# Patient Record
Sex: MALE | Race: OTHER | Employment: FULL TIME | URBAN - METROPOLITAN AREA
[De-identification: names, ages, dates, MRNs, and addresses within clinical notes are randomized per-mention and may not be internally consistent; named-entity substitution may affect disease eponyms.]

---

## 2021-08-20 ENCOUNTER — HOSPITAL ENCOUNTER (INPATIENT)
Age: 31
LOS: 2 days | Discharge: HOME | DRG: 854 | End: 2021-08-25
Attending: EMERGENCY MEDICINE | Admitting: SURGERY

## 2021-08-20 ENCOUNTER — APPOINTMENT (OUTPATIENT)
Dept: CT IMAGING | Age: 31
DRG: 854 | End: 2021-08-20
Attending: EMERGENCY MEDICINE

## 2021-08-20 ENCOUNTER — ANESTHESIA (OUTPATIENT)
Dept: SURGERY | Age: 31
DRG: 854 | End: 2021-08-20

## 2021-08-20 ENCOUNTER — APPOINTMENT (OUTPATIENT)
Dept: GENERAL RADIOLOGY | Age: 31
DRG: 854 | End: 2021-08-20
Attending: EMERGENCY MEDICINE

## 2021-08-20 ENCOUNTER — ANESTHESIA EVENT (OUTPATIENT)
Dept: SURGERY | Age: 31
DRG: 854 | End: 2021-08-20

## 2021-08-20 DIAGNOSIS — K35.30 ACUTE APPENDICITIS WITH LOCALIZED PERITONITIS, WITHOUT PERFORATION, ABSCESS, OR GANGRENE: ICD-10-CM

## 2021-08-20 DIAGNOSIS — K35.30 ACUTE APPENDICITIS WITH LOCALIZED PERITONITIS, WITHOUT PERFORATION OR ABSCESS, UNSPECIFIED WHETHER GANGRENE PRESENT: Primary | ICD-10-CM

## 2021-08-20 DIAGNOSIS — A41.9 SEPSIS WITHOUT ACUTE ORGAN DYSFUNCTION, DUE TO UNSPECIFIED ORGANISM (HCC): ICD-10-CM

## 2021-08-20 DIAGNOSIS — R19.8: ICD-10-CM

## 2021-08-20 PROBLEM — K35.80 ACUTE APPENDICITIS: Status: ACTIVE | Noted: 2021-08-20

## 2021-08-20 PROBLEM — R00.0 TACHYCARDIA: Status: ACTIVE | Noted: 2021-08-20

## 2021-08-20 PROBLEM — R50.9 FEVER: Status: ACTIVE | Noted: 2021-08-20

## 2021-08-20 PROBLEM — D72.829 LEUKOCYTOSIS: Status: ACTIVE | Noted: 2021-08-20

## 2021-08-20 PROBLEM — R10.31 RLQ ABDOMINAL PAIN: Status: ACTIVE | Noted: 2021-08-20

## 2021-08-20 LAB
ALBUMIN SERPL-MCNC: 3.3 G/DL (ref 3.5–5)
ALBUMIN/GLOB SERPL: 0.6 {RATIO} (ref 1.2–3.5)
ALP SERPL-CCNC: 82 U/L (ref 50–136)
ALT SERPL-CCNC: 36 U/L (ref 12–65)
ANION GAP SERPL CALC-SCNC: 5 MMOL/L (ref 7–16)
AST SERPL-CCNC: 16 U/L (ref 15–37)
BACTERIA URNS QL MICRO: 0 /HPF
BASOPHILS # BLD: 0 K/UL (ref 0–0.2)
BASOPHILS NFR BLD: 0 % (ref 0–2)
BILIRUB SERPL-MCNC: 1.5 MG/DL (ref 0.2–1.1)
BUN SERPL-MCNC: 10 MG/DL (ref 6–23)
CALCIUM SERPL-MCNC: 9 MG/DL (ref 8.3–10.4)
CASTS URNS QL MICRO: NORMAL /LPF
CHLORIDE SERPL-SCNC: 105 MMOL/L (ref 98–107)
CO2 SERPL-SCNC: 28 MMOL/L (ref 21–32)
CREAT SERPL-MCNC: 1.06 MG/DL (ref 0.8–1.5)
DIFFERENTIAL METHOD BLD: ABNORMAL
EOSINOPHIL # BLD: 0.1 K/UL (ref 0–0.8)
EOSINOPHIL NFR BLD: 0 % (ref 0.5–7.8)
EPI CELLS #/AREA URNS HPF: NORMAL /HPF
ERYTHROCYTE [DISTWIDTH] IN BLOOD BY AUTOMATED COUNT: 12.8 % (ref 11.9–14.6)
GLOBULIN SER CALC-MCNC: 5.5 G/DL (ref 2.3–3.5)
GLUCOSE SERPL-MCNC: 112 MG/DL (ref 65–100)
HCT VFR BLD AUTO: 46.7 % (ref 41.1–50.3)
HGB BLD-MCNC: 16 G/DL (ref 13.6–17.2)
IMM GRANULOCYTES # BLD AUTO: 0.1 K/UL (ref 0–0.5)
IMM GRANULOCYTES NFR BLD AUTO: 1 % (ref 0–5)
LACTATE SERPL-SCNC: 1.7 MMOL/L (ref 0.4–2)
LIPASE SERPL-CCNC: 93 U/L (ref 73–393)
LYMPHOCYTES # BLD: 0.4 K/UL (ref 0.5–4.6)
LYMPHOCYTES NFR BLD: 2 % (ref 13–44)
MCH RBC QN AUTO: 30 PG (ref 26.1–32.9)
MCHC RBC AUTO-ENTMCNC: 34.3 G/DL (ref 31.4–35)
MCV RBC AUTO: 87.6 FL (ref 79.6–97.8)
MONOCYTES # BLD: 0.8 K/UL (ref 0.1–1.3)
MONOCYTES NFR BLD: 4 % (ref 4–12)
NEUTS SEG # BLD: 18.4 K/UL (ref 1.7–8.2)
NEUTS SEG NFR BLD: 93 % (ref 43–78)
NRBC # BLD: 0 K/UL (ref 0–0.2)
PLATELET # BLD AUTO: 233 K/UL (ref 150–450)
PMV BLD AUTO: 10.6 FL (ref 9.4–12.3)
POTASSIUM SERPL-SCNC: 3.5 MMOL/L (ref 3.5–5.1)
PROCALCITONIN SERPL-MCNC: 13.73 NG/ML
PROT SERPL-MCNC: 8.8 G/DL (ref 6.3–8.2)
RBC # BLD AUTO: 5.33 M/UL (ref 4.23–5.6)
RBC #/AREA URNS HPF: 0 /HPF
SODIUM SERPL-SCNC: 138 MMOL/L (ref 138–145)
WBC # BLD AUTO: 19.8 K/UL (ref 4.3–11.1)
WBC URNS QL MICRO: NORMAL /HPF

## 2021-08-20 PROCEDURE — 76060000033 HC ANESTHESIA 1 TO 1.5 HR: Performed by: SURGERY

## 2021-08-20 PROCEDURE — 77030008771 HC TU NG SALEM SUMP -A: Performed by: ANESTHESIOLOGY

## 2021-08-20 PROCEDURE — 77030008606 HC TRCR ENDOSC KII AMR -B: Performed by: SURGERY

## 2021-08-20 PROCEDURE — 87150 DNA/RNA AMPLIFIED PROBE: CPT

## 2021-08-20 PROCEDURE — 77030040922 HC BLNKT HYPOTHRM STRY -A: Performed by: ANESTHESIOLOGY

## 2021-08-20 PROCEDURE — 87076 CULTURE ANAEROBE IDENT EACH: CPT

## 2021-08-20 PROCEDURE — 74011250636 HC RX REV CODE- 250/636: Performed by: NURSE ANESTHETIST, CERTIFIED REGISTERED

## 2021-08-20 PROCEDURE — 88304 TISSUE EXAM BY PATHOLOGIST: CPT

## 2021-08-20 PROCEDURE — 71045 X-RAY EXAM CHEST 1 VIEW: CPT

## 2021-08-20 PROCEDURE — 0DTJ4ZZ RESECTION OF APPENDIX, PERCUTANEOUS ENDOSCOPIC APPROACH: ICD-10-PCS | Performed by: SURGERY

## 2021-08-20 PROCEDURE — 81015 MICROSCOPIC EXAM OF URINE: CPT

## 2021-08-20 PROCEDURE — 77030008518 HC TBNG INSUF ENDO STRY -B: Performed by: SURGERY

## 2021-08-20 PROCEDURE — 77030000038 HC TIP SCIS LAPSCP SURI -B: Performed by: SURGERY

## 2021-08-20 PROCEDURE — 77030008462 HC STPLR SKN PROX J&J -A: Performed by: SURGERY

## 2021-08-20 PROCEDURE — 84145 PROCALCITONIN (PCT): CPT

## 2021-08-20 PROCEDURE — 83605 ASSAY OF LACTIC ACID: CPT

## 2021-08-20 PROCEDURE — 74011250637 HC RX REV CODE- 250/637: Performed by: ANESTHESIOLOGY

## 2021-08-20 PROCEDURE — 77030039425 HC BLD LARYNG TRULITE DISP TELE -A: Performed by: ANESTHESIOLOGY

## 2021-08-20 PROCEDURE — 74177 CT ABD & PELVIS W/CONTRAST: CPT

## 2021-08-20 PROCEDURE — 87186 SC STD MICRODIL/AGAR DIL: CPT

## 2021-08-20 PROCEDURE — 96367 TX/PROPH/DG ADDL SEQ IV INF: CPT

## 2021-08-20 PROCEDURE — 77030037088 HC TUBE ENDOTRACH ORAL NSL COVD-A: Performed by: ANESTHESIOLOGY

## 2021-08-20 PROCEDURE — 80053 COMPREHEN METABOLIC PANEL: CPT

## 2021-08-20 PROCEDURE — 77030021158 HC TRCR BLN GELPRT AMR -B: Performed by: SURGERY

## 2021-08-20 PROCEDURE — 96375 TX/PRO/DX INJ NEW DRUG ADDON: CPT

## 2021-08-20 PROCEDURE — 99285 EMERGENCY DEPT VISIT HI MDM: CPT

## 2021-08-20 PROCEDURE — 77030019908 HC STETH ESOPH SIMS -A: Performed by: ANESTHESIOLOGY

## 2021-08-20 PROCEDURE — 77030022703 HC LIGASURE  BLNT LAPSCP SEAL COVD -E: Performed by: SURGERY

## 2021-08-20 PROCEDURE — 74011000250 HC RX REV CODE- 250: Performed by: NURSE ANESTHETIST, CERTIFIED REGISTERED

## 2021-08-20 PROCEDURE — 99218 HC RM OBSERVATION: CPT

## 2021-08-20 PROCEDURE — 74011000258 HC RX REV CODE- 258: Performed by: SURGERY

## 2021-08-20 PROCEDURE — 81003 URINALYSIS AUTO W/O SCOPE: CPT

## 2021-08-20 PROCEDURE — 77030037892: Performed by: SURGERY

## 2021-08-20 PROCEDURE — 99220 PR INITIAL OBSERVATION CARE/DAY 70 MINUTES: CPT | Performed by: SURGERY

## 2021-08-20 PROCEDURE — 76010000149 HC OR TIME 1 TO 1.5 HR: Performed by: SURGERY

## 2021-08-20 PROCEDURE — 87040 BLOOD CULTURE FOR BACTERIA: CPT

## 2021-08-20 PROCEDURE — 77030027876 HC STPLR ENDOSC FLX PWR J&J -G1: Performed by: SURGERY

## 2021-08-20 PROCEDURE — 74011000250 HC RX REV CODE- 250: Performed by: SURGERY

## 2021-08-20 PROCEDURE — 44970 LAPAROSCOPY APPENDECTOMY: CPT | Performed by: SURGERY

## 2021-08-20 PROCEDURE — 74011250636 HC RX REV CODE- 250/636: Performed by: ANESTHESIOLOGY

## 2021-08-20 PROCEDURE — 85025 COMPLETE CBC W/AUTO DIFF WBC: CPT

## 2021-08-20 PROCEDURE — 93005 ELECTROCARDIOGRAM TRACING: CPT | Performed by: EMERGENCY MEDICINE

## 2021-08-20 PROCEDURE — 96365 THER/PROPH/DIAG IV INF INIT: CPT

## 2021-08-20 PROCEDURE — 76210000006 HC OR PH I REC 0.5 TO 1 HR: Performed by: SURGERY

## 2021-08-20 PROCEDURE — 77030040830 HC CATH URETH FOL MDII -A: Performed by: SURGERY

## 2021-08-20 PROCEDURE — 77030031139 HC SUT VCRL2 J&J -A: Performed by: SURGERY

## 2021-08-20 PROCEDURE — 94762 N-INVAS EAR/PLS OXIMTRY CONT: CPT

## 2021-08-20 PROCEDURE — 74011250636 HC RX REV CODE- 250/636: Performed by: EMERGENCY MEDICINE

## 2021-08-20 PROCEDURE — 74011250636 HC RX REV CODE- 250/636: Performed by: SURGERY

## 2021-08-20 PROCEDURE — 77030038552 HC DRN WND MDII -A: Performed by: SURGERY

## 2021-08-20 PROCEDURE — 74011000636 HC RX REV CODE- 636: Performed by: EMERGENCY MEDICINE

## 2021-08-20 PROCEDURE — 77030040361 HC SLV COMPR DVT MDII -B: Performed by: SURGERY

## 2021-08-20 PROCEDURE — 77030036731 HC STPLR ENDOSC J&J -F: Performed by: SURGERY

## 2021-08-20 PROCEDURE — 74011000258 HC RX REV CODE- 258: Performed by: EMERGENCY MEDICINE

## 2021-08-20 PROCEDURE — 83690 ASSAY OF LIPASE: CPT

## 2021-08-20 PROCEDURE — 2709999900 HC NON-CHARGEABLE SUPPLY: Performed by: SURGERY

## 2021-08-20 RX ORDER — ONDANSETRON 2 MG/ML
INJECTION INTRAMUSCULAR; INTRAVENOUS AS NEEDED
Status: DISCONTINUED | OUTPATIENT
Start: 2021-08-20 | End: 2021-08-20 | Stop reason: HOSPADM

## 2021-08-20 RX ORDER — SODIUM CHLORIDE 0.9 % (FLUSH) 0.9 %
10 SYRINGE (ML) INJECTION
Status: COMPLETED | OUTPATIENT
Start: 2021-08-20 | End: 2021-08-20

## 2021-08-20 RX ORDER — SODIUM CHLORIDE 0.9 % (FLUSH) 0.9 %
5-40 SYRINGE (ML) INJECTION EVERY 8 HOURS
Status: DISCONTINUED | OUTPATIENT
Start: 2021-08-20 | End: 2021-08-20 | Stop reason: HOSPADM

## 2021-08-20 RX ORDER — ONDANSETRON 2 MG/ML
4 INJECTION INTRAMUSCULAR; INTRAVENOUS
Status: COMPLETED | OUTPATIENT
Start: 2021-08-20 | End: 2021-08-20

## 2021-08-20 RX ORDER — SODIUM CHLORIDE 9 MG/ML
1000 INJECTION, SOLUTION INTRAVENOUS ONCE
Status: COMPLETED | OUTPATIENT
Start: 2021-08-20 | End: 2021-08-20

## 2021-08-20 RX ORDER — ROCURONIUM BROMIDE 10 MG/ML
INJECTION, SOLUTION INTRAVENOUS AS NEEDED
Status: DISCONTINUED | OUTPATIENT
Start: 2021-08-20 | End: 2021-08-20 | Stop reason: HOSPADM

## 2021-08-20 RX ORDER — SODIUM CHLORIDE, SODIUM LACTATE, POTASSIUM CHLORIDE, CALCIUM CHLORIDE 600; 310; 30; 20 MG/100ML; MG/100ML; MG/100ML; MG/100ML
75 INJECTION, SOLUTION INTRAVENOUS CONTINUOUS
Status: DISCONTINUED | OUTPATIENT
Start: 2021-08-20 | End: 2021-08-20 | Stop reason: HOSPADM

## 2021-08-20 RX ORDER — LIDOCAINE HYDROCHLORIDE 20 MG/ML
INJECTION, SOLUTION EPIDURAL; INFILTRATION; INTRACAUDAL; PERINEURAL AS NEEDED
Status: DISCONTINUED | OUTPATIENT
Start: 2021-08-20 | End: 2021-08-20 | Stop reason: HOSPADM

## 2021-08-20 RX ORDER — HYDROCODONE BITARTRATE AND ACETAMINOPHEN 5; 325 MG/1; MG/1
1-2 TABLET ORAL EVERY 8 HOURS
Qty: 28 TABLET | Refills: 0 | Status: SHIPPED | OUTPATIENT
Start: 2021-08-20 | End: 2021-08-25 | Stop reason: SDUPTHER

## 2021-08-20 RX ORDER — HYDROMORPHONE HYDROCHLORIDE 2 MG/ML
0.5 INJECTION, SOLUTION INTRAMUSCULAR; INTRAVENOUS; SUBCUTANEOUS
Status: DISCONTINUED | OUTPATIENT
Start: 2021-08-20 | End: 2021-08-20 | Stop reason: HOSPADM

## 2021-08-20 RX ORDER — SODIUM CHLORIDE 0.9 % (FLUSH) 0.9 %
5-10 SYRINGE (ML) INJECTION EVERY 8 HOURS
Status: DISCONTINUED | OUTPATIENT
Start: 2021-08-20 | End: 2021-08-21

## 2021-08-20 RX ORDER — DEXAMETHASONE SODIUM PHOSPHATE 4 MG/ML
INJECTION, SOLUTION INTRA-ARTICULAR; INTRALESIONAL; INTRAMUSCULAR; INTRAVENOUS; SOFT TISSUE AS NEEDED
Status: DISCONTINUED | OUTPATIENT
Start: 2021-08-20 | End: 2021-08-20 | Stop reason: HOSPADM

## 2021-08-20 RX ORDER — NALOXONE HYDROCHLORIDE 0.4 MG/ML
0.2 INJECTION, SOLUTION INTRAMUSCULAR; INTRAVENOUS; SUBCUTANEOUS AS NEEDED
Status: DISCONTINUED | OUTPATIENT
Start: 2021-08-20 | End: 2021-08-20 | Stop reason: HOSPADM

## 2021-08-20 RX ORDER — SODIUM CHLORIDE, SODIUM LACTATE, POTASSIUM CHLORIDE, CALCIUM CHLORIDE 600; 310; 30; 20 MG/100ML; MG/100ML; MG/100ML; MG/100ML
150 INJECTION, SOLUTION INTRAVENOUS CONTINUOUS
Status: DISCONTINUED | OUTPATIENT
Start: 2021-08-20 | End: 2021-08-21

## 2021-08-20 RX ORDER — SODIUM CHLORIDE 0.9 % (FLUSH) 0.9 %
5-10 SYRINGE (ML) INJECTION AS NEEDED
Status: DISCONTINUED | OUTPATIENT
Start: 2021-08-20 | End: 2021-08-21

## 2021-08-20 RX ORDER — MORPHINE SULFATE 10 MG/ML
5 INJECTION, SOLUTION INTRAMUSCULAR; INTRAVENOUS
Status: COMPLETED | OUTPATIENT
Start: 2021-08-20 | End: 2021-08-20

## 2021-08-20 RX ORDER — PROPOFOL 10 MG/ML
INJECTION, EMULSION INTRAVENOUS AS NEEDED
Status: DISCONTINUED | OUTPATIENT
Start: 2021-08-20 | End: 2021-08-20 | Stop reason: HOSPADM

## 2021-08-20 RX ORDER — BUPIVACAINE HYDROCHLORIDE 2.5 MG/ML
INJECTION, SOLUTION EPIDURAL; INFILTRATION; INTRACAUDAL AS NEEDED
Status: DISCONTINUED | OUTPATIENT
Start: 2021-08-20 | End: 2021-08-20 | Stop reason: HOSPADM

## 2021-08-20 RX ORDER — METRONIDAZOLE 500 MG/100ML
500 INJECTION, SOLUTION INTRAVENOUS ONCE
Status: COMPLETED | OUTPATIENT
Start: 2021-08-20 | End: 2021-08-20

## 2021-08-20 RX ORDER — SODIUM CHLORIDE 0.9 % (FLUSH) 0.9 %
5-40 SYRINGE (ML) INJECTION AS NEEDED
Status: DISCONTINUED | OUTPATIENT
Start: 2021-08-20 | End: 2021-08-20 | Stop reason: HOSPADM

## 2021-08-20 RX ORDER — FENTANYL CITRATE 50 UG/ML
INJECTION, SOLUTION INTRAMUSCULAR; INTRAVENOUS AS NEEDED
Status: DISCONTINUED | OUTPATIENT
Start: 2021-08-20 | End: 2021-08-20 | Stop reason: HOSPADM

## 2021-08-20 RX ORDER — OXYCODONE AND ACETAMINOPHEN 5; 325 MG/1; MG/1
1 TABLET ORAL AS NEEDED
Status: DISCONTINUED | OUTPATIENT
Start: 2021-08-20 | End: 2021-08-20 | Stop reason: HOSPADM

## 2021-08-20 RX ADMIN — IOPAMIDOL 100 ML: 755 INJECTION, SOLUTION INTRAVENOUS at 18:29

## 2021-08-20 RX ADMIN — ROCURONIUM BROMIDE 40 MG: 10 INJECTION, SOLUTION INTRAVENOUS at 20:34

## 2021-08-20 RX ADMIN — CEFTRIAXONE 2 G: 2 INJECTION, POWDER, FOR SOLUTION INTRAMUSCULAR; INTRAVENOUS at 18:06

## 2021-08-20 RX ADMIN — PHENYLEPHRINE HYDROCHLORIDE 120 MCG: 10 INJECTION INTRAVENOUS at 20:48

## 2021-08-20 RX ADMIN — PIPERACILLIN AND TAZOBACTAM 4.5 G: 4; .5 INJECTION, POWDER, FOR SOLUTION INTRAVENOUS at 20:28

## 2021-08-20 RX ADMIN — HYDROMORPHONE HYDROCHLORIDE 0.5 MG: 2 INJECTION, SOLUTION INTRAMUSCULAR; INTRAVENOUS; SUBCUTANEOUS at 21:53

## 2021-08-20 RX ADMIN — SUGAMMADEX 200 MG: 100 INJECTION, SOLUTION INTRAVENOUS at 21:38

## 2021-08-20 RX ADMIN — SODIUM CHLORIDE 1000 ML: 900 INJECTION, SOLUTION INTRAVENOUS at 18:06

## 2021-08-20 RX ADMIN — Medication 5 ML: at 17:40

## 2021-08-20 RX ADMIN — ACETAMINOPHEN 650 MG: 325 SUSPENSION ORAL at 21:38

## 2021-08-20 RX ADMIN — HYDROMORPHONE HYDROCHLORIDE 0.5 MG: 2 INJECTION, SOLUTION INTRAMUSCULAR; INTRAVENOUS; SUBCUTANEOUS at 22:08

## 2021-08-20 RX ADMIN — Medication 10 ML: at 18:29

## 2021-08-20 RX ADMIN — METRONIDAZOLE 500 MG: 500 INJECTION, SOLUTION INTRAVENOUS at 19:38

## 2021-08-20 RX ADMIN — FENTANYL CITRATE 50 MCG: 50 INJECTION INTRAMUSCULAR; INTRAVENOUS at 20:34

## 2021-08-20 RX ADMIN — LIDOCAINE HYDROCHLORIDE 80 MG: 20 INJECTION, SOLUTION EPIDURAL; INFILTRATION; INTRACAUDAL; PERINEURAL at 20:34

## 2021-08-20 RX ADMIN — PROPOFOL 200 MG: 10 INJECTION, EMULSION INTRAVENOUS at 20:34

## 2021-08-20 RX ADMIN — DEXAMETHASONE SODIUM PHOSPHATE 4 MG: 4 INJECTION, SOLUTION INTRAMUSCULAR; INTRAVENOUS at 20:59

## 2021-08-20 RX ADMIN — SODIUM CHLORIDE 100 ML: 900 INJECTION, SOLUTION INTRAVENOUS at 18:29

## 2021-08-20 RX ADMIN — MORPHINE SULFATE 5 MG: 10 INJECTION INTRAVENOUS at 17:37

## 2021-08-20 RX ADMIN — ONDANSETRON 4 MG: 2 INJECTION INTRAMUSCULAR; INTRAVENOUS at 17:38

## 2021-08-20 RX ADMIN — ONDANSETRON 4 MG: 2 INJECTION INTRAMUSCULAR; INTRAVENOUS at 20:59

## 2021-08-20 RX ADMIN — SODIUM CHLORIDE, SODIUM LACTATE, POTASSIUM CHLORIDE, AND CALCIUM CHLORIDE: 600; 310; 30; 20 INJECTION, SOLUTION INTRAVENOUS at 20:28

## 2021-08-20 NOTE — ED TRIAGE NOTES
Arrives ambulatory from [address] [place] [address] with labwork. Sent here for epigastric pain radiating to RLQ x 2 days. Intermittent nausea but no vomiting; denies diarrhea. Did not realize he had fever. Per labs pt has WBC 20.8. Pain is sharp/burning in nature. Pain slightly relieved with \"stretching out\"; worse when bending over.

## 2021-08-20 NOTE — ED PROVIDER NOTES
The history is provided by the patient. Abdominal Pain   This is a new problem. The current episode started yesterday. The problem occurs constantly. The problem has been gradually worsening. The pain is associated with vomiting. The pain is located in the RLQ and epigastric region. The quality of the pain is sharp. The pain is moderate. Associated symptoms include a fever, nausea, vomiting and constipation. Pertinent negatives include no diarrhea, no dysuria, no frequency, no myalgias, no chest pain and no back pain. No past medical history on file. No past surgical history on file. No family history on file. Social History     Socioeconomic History    Marital status:      Spouse name: Not on file    Number of children: Not on file    Years of education: Not on file    Highest education level: Not on file   Occupational History    Not on file   Tobacco Use    Smoking status: Not on file   Substance and Sexual Activity    Alcohol use: Not on file    Drug use: Not on file    Sexual activity: Not on file   Other Topics Concern    Not on file   Social History Narrative    Not on file     Social Determinants of Health     Financial Resource Strain:     Difficulty of Paying Living Expenses:    Food Insecurity:     Worried About Running Out of Food in the Last Year:     [address] in the Last Year:    Transportation Needs:     Lack of Transportation (Medical):      Lack of Transportation (Non-Medical):    Physical Activity:     Days of Exercise per Week:     Minutes of Exercise per Session:    Stress:     Feeling of Stress :    Social Connections:     Frequency of Communication with Friends and Family:     Frequency of Social Gatherings with Friends and Family:     Attends Taoist Services:     Active Member of Clubs or Organizations:     Attends Club or Organization Meetings:     Marital Status:    Intimate Partner Violence:     Fear of Current or Ex-Partner:  Emotionally Abused:     Physically Abused:     Sexually Abused: ALLERGIES: Patient has no known allergies. Review of Systems   Constitutional: Positive for fever. Negative for chills. HENT: Negative for congestion and rhinorrhea. Respiratory: Negative for cough and shortness of breath. Cardiovascular: Negative for chest pain. Gastrointestinal: Positive for abdominal pain, constipation, nausea and vomiting. Negative for diarrhea. Endocrine: Negative for polydipsia and polyuria. Genitourinary: Negative for dysuria, frequency and urgency. Musculoskeletal: Negative for back pain and myalgias. All other systems reviewed and are negative. Vitals:    08/20/21 1613 08/20/21 1614 08/20/21 1653   BP: 133/76  (!) 144/72   Pulse: 96     Resp: 22     Temp: (!) 102.8 °F (39.3 °C)     SpO2: 99%  98%   Weight:  77.1 kg (170 lb)    Height:  5' 4\" (1.626 m)             Physical Exam  Vitals and nursing note reviewed. Constitutional:       Appearance: He is well-developed. HENT:      Mouth/Throat:      Pharynx: No oropharyngeal exudate. Eyes:      Conjunctiva/sclera: Conjunctivae normal.      Pupils: Pupils are equal, round, and reactive to light. Cardiovascular:      Rate and Rhythm: Normal rate and regular rhythm. Heart sounds: Normal heart sounds. Pulmonary:      Effort: Pulmonary effort is normal.      Breath sounds: Normal breath sounds. Abdominal:      General: Bowel sounds are normal. There is no distension. Palpations: Abdomen is soft. Tenderness: There is generalized abdominal tenderness and tenderness in the right lower quadrant. There is no guarding or rebound. Positive signs include McBurney's sign. Negative signs include Rovsing's sign. Musculoskeletal:         General: No tenderness. Normal range of motion. Cervical back: Neck supple. Lymphadenopathy:      Cervical: No cervical adenopathy. Skin:     General: Skin is warm and dry. Neurological:      Mental Status: He is alert and oriented to person, place, and time. MDM  Number of Diagnoses or Management Options  Acute appendicitis with localized peritonitis, without perforation or abscess, unspecified whether gangrene present  Sepsis without acute organ dysfunction, due to unspecified organism [facility])  Diagnosis management comments: Exam concerning for acute appendicitis. CT scan pending. No peritonitis. Analgesia will be provided. 6:59 PM  Patient has been covered with antibiotics and CT does show acute appendicitis with periappendiceal inflammatory changes but no obvious perforation or abscess. Surgery consulted for admission and appendectomy.        Amount and/or Complexity of Data Reviewed  Clinical lab tests: ordered and reviewed (Results for orders placed or performed during the hospital encounter of 08/20/21  -LACTIC ACID       Result                      Value             Ref Range           Lactic acid                 1.7               0.4 - 2.0 MM*  -CBC WITH AUTOMATED DIFF       Result                      Value             Ref Range           WBC                         19.8 (H)          4.3 - 11.1 K*       RBC                         5.33              4.23 - 5.6 M*       HGB                         16.0              13.6 - 17.2 *       HCT                         46.7              41.1 - 50.3 %       MCV                         87.6              79.6 - 97.8 *       MCH                         30.0              26.1 - 32.9 *       MCHC                        34.3              31.4 - 35.0 *       RDW                         12.8              11.9 - 14.6 %       PLATELET                    233               150 - 450 K/*       MPV                         10.6              9.4 - 12.3 FL       ABSOLUTE NRBC               0.00              0.0 - 0.2 K/*       DF                          AUTOMATED                             NEUTROPHILS                 93 (H) 43 - 78 %           LYMPHOCYTES                 2 (L)             13 - 44 %           MONOCYTES                   4                 4.0 - 12.0 %        EOSINOPHILS                 0 (L)             0.5 - 7.8 %         BASOPHILS                   0                 0.0 - 2.0 %         IMMATURE GRANULOCYTES       1                 0.0 - 5.0 %         ABS. NEUTROPHILS            18.4 (H)          1.7 - 8.2 K/*       ABS. LYMPHOCYTES            0.4 (L)           0.5 - 4.6 K/*       ABS. MONOCYTES              0.8               0.1 - 1.3 K/*       ABS. EOSINOPHILS            0.1               0.0 - 0.8 K/*       ABS. BASOPHILS              0.0               0.0 - 0.2 K/*       ABS. IMM. GRANS.            0.1               0.0 - 0.5 K/*  -METABOLIC PANEL, COMPREHENSIVE       Result                      Value             Ref Range           Sodium                      138               138 - 145 mm*       Potassium                   3.5               3.5 - 5.1 mm*       Chloride                    105               98 - 107 mmo*       CO2                         28                21 - 32 mmol*       Anion gap                   5 (L)             7 - 16 mmol/L       Glucose                     112 (H)           65 - 100 mg/*       BUN                         10                6 - 23 MG/DL        Creatinine                  1.06              0.8 - 1.5 MG*       GFR est AA                  >60               >60 ml/min/1*       GFR est non-AA              >60               >60 ml/min/1*       Calcium                     9.0               8.3 - 10.4 M*       Bilirubin, total            1.5 (H)           0.2 - 1.1 MG*       ALT (SGPT)                  36                12 - 65 U/L         AST (SGOT)                  16                15 - 37 U/L         Alk.  phosphatase            82                50 - 136 U/L        Protein, total              8.8 (H)           6.3 - 8.2 g/*       Albumin                     3.3 (L) 3.5 - 5.0 g/*       Globulin                    5.5 (H)           2.3 - 3.5 g/*       A-G Ratio                   0.6 (L)           1.2 - 3.5      -PROCALCITONIN       Result                      Value             Ref Range           Procalcitonin               13.73             ng/mL          -LIPASE       Result                      Value             Ref Range           Lipase                      93                73 - 393 U/L   -URINE MICROSCOPIC       Result                      Value             Ref Range           WBC                         0-3               0 /hpf              RBC                         0                 0 /hpf              Epithelial cells            0-3               0 /hpf              Bacteria                    0                 0 /hpf              Casts                       0-3               0 /lpf         )  Tests in the radiology section of CPT®: ordered and reviewed (CT ABD PELV W CONT    Result Date: 8/20/2021  CT OF THE ABDOMEN AND PELVIS WITH CONTRAST. CLINICAL INDICATION: Right lower quadrant abdominal pain for two days, fever, elevated white blood cell count PROCEDURE: Serial thin section axial images obtained from the lung bases through the proximal femurs following the administration of 100 cc of Isovue 370 intravenous contrast.  Radiation dose reduction techniques were used for this study. Our CT scanners use one or all of the following: Automated exposure control, adjusted of the mA and/or kV according to patient size, iterative reconstruction COMPARISON: No prior FINDINGS: CT ABDOMEN: There is a dilated, inflamed appendix that is fluid-filled with marked para appendiceal fat inflammation. The appendix measures 10 mm in transverse dimension. No periappendiceal abscess or free air present to strongly suspect perforation. The liver, spleen, pancreas and kidneys are normal. There is no hydronephrosis.  The gallbladder is normal. The adrenal glands are normal. The bowel is normal in course, caliber, and wall thickness. CT PELVIS: The rectum is normal. The bladder is normal. No interval hernia or adenopathy. No free fluid accumulating dependently in the pelvis. No aggressive osseous lesions identified. Acute appendicitis with marked periappendiceal inflammatory fat stranding in the right lower abdominal quadrant without definite abscess or extraluminal air to indicate perforation. These important findings were called to the emergency room physician Dr. Yeny Villela by Dr. Fritz Pink at 6:50 PM on 8/20/2021      XR CHEST PORT    Result Date: 8/20/2021  Single view chest x-ray Clinical indication: Epigastric abdominal pain, sepsis FINDINGS: Single view of the chest show the lungs to be expanded and clear. No pleural effusion or pneumothorax. The cardiac silhouette and mediastinum are unremarkable. The bones are normal.     Normal single view chest x-ray.    )  Discuss the patient with other providers: yes    Risk of Complications, Morbidity, and/or Mortality  Presenting problems: moderate  Diagnostic procedures: low  Management options: moderate  General comments: CRITICAL CARE Documentation: This patient is critically ill and there is a high probability of of imminent or life threatening deterioration in the patient's condition without immediate management. The nature of the patient's clinical problem is: Acute appendicitis and sepsis    I have spent 30 minutes in direct patient care, documentation, review of labs/xrays/old records, discussion with Staff, patient, consultant . The time involved in the performance of separately reportable procedures was not counted toward critical care time.      Jaxson Ramirez MD; 8/20/2021 @7:01 PM      Orders Placed This Encounter      BLOOD CULTURE      BLOOD CULTURE      XR CHEST PORT      CT ABD PELV W CONT      LACTIC ACID      CBC WITH DIFF      CMP      PROCALCITONIN      LIPASE      URINE MICROSCOPIC      POC URINE DIPSTICK NURSING-MISCELLANEOUS: IF PATIENT IS UNABLE TO PROVIDE URINE PLEASE PERFORM STRAIGHT CATHETERIZATION. IF PATIENT IS UNABLE TO PROVIDE URINE PLEASE PERFORM STRAIGHT CATHETERIZATION. ONE TIME      CARDIAC MONITOR - ED ONLY      PULSE OXIMETRY CONTINUOUS      EKG, 12 LEAD, INITIAL      SALINE LOCK IV ONE TIME Routine      sodium chloride (NS) flush 5-10 mL      sodium chloride (NS) flush 5-10 mL      morphine 10 mg/ml injection 5 mg      ondansetron (ZOFRAN) injection 4 mg      cefTRIAXone (ROCEPHIN) 2 g in 0.9% sodium chloride (MBP/ADV) 50 mL MBP      metroNIDAZOLE (FLAGYL) IVPB premix 500 mg      0.9% sodium chloride infusion 1,000 mL      sodium chloride 0.9 % bolus infusion 100 mL      saline peripheral flush soln 10 mL      iopamidoL (ISOVUE-370) 76 % injection 100 mL         (K35.30) Acute appendicitis with localized peritonitis, without perforation or abscess, unspecified whether gangrene present  (primary encounter diagnosis)    (A41.9) Sepsis without acute organ dysfunction, due to unspecified organism [facility])        Results for orders placed or performed during the hospital encounter of 08/20/21  1.  LACTIC ACID       Result                      Value             Ref Range           Lactic acid                 1.7               0.4 - 2.0 MM*  2. CBC WITH AUTOMATED DIFF       Result                      Value             Ref Range           WBC                         19.8 (H)          4.3 - 11.1 K*       RBC                         5.33              4.23 - 5.6 M*       HGB                         16.0              13.6 - 17.2 *       HCT                         46.7              41.1 - 50.3 %       MCV                         87.6              79.6 - 97.8 *       MCH                         30.0              26.1 - 32.9 *       MCHC                        34.3              31.4 - 35.0 *       RDW                         12.8              11.9 - 14.6 %       PLATELET                    233               150 - 450 K/*       MPV                         10.6              9.4 - 12.3 FL       ABSOLUTE NRBC               0.00              0.0 - 0.2 K/*       DF                          AUTOMATED                             NEUTROPHILS                 93 (H)            43 - 78 %           LYMPHOCYTES                 2 (L)             13 - 44 %           MONOCYTES                   4                 4.0 - 12.0 %        EOSINOPHILS                 0 (L)             0.5 - 7.8 %         BASOPHILS                   0                 0.0 - 2.0 %         IMMATURE GRANULOCYTES       1                 0.0 - 5.0 %         ABS. NEUTROPHILS            18.4 (H)          1.7 - 8.2 K/*       ABS. LYMPHOCYTES            0.4 (L)           0.5 - 4.6 K/*       ABS. MONOCYTES              0.8               0.1 - 1.3 K/*       ABS. EOSINOPHILS            0.1               0.0 - 0.8 K/*       ABS. BASOPHILS              0.0               0.0 - 0.2 K/*       ABS. IMM.  GRANS.            0.1               0.0 - 0.5 K/*  3. METABOLIC PANEL, COMPREHENSIVE       Result                      Value             Ref Range           Sodium                      138               138 - 145 mm*       Potassium                   3.5               3.5 - 5.1 mm*       Chloride                    105               98 - 107 mmo*       CO2                         28                21 - 32 mmol*       Anion gap                   5 (L)             7 - 16 mmol/L       Glucose                     112 (H)           65 - 100 mg/*       BUN                         10                6 - 23 MG/DL        Creatinine                  1.06              0.8 - 1.5 MG*       GFR est AA                  >60               >60 ml/min/1*       GFR est non-AA              >60               >60 ml/min/1*       Calcium                     9.0               8.3 - 10.4 M*       Bilirubin, total            1.5 (H)           0.2 - 1.1 MG*       ALT (SGPT)                  36                12 - 65 U/L         AST (SGOT)                  16                15 - 37 U/L         Alk. phosphatase            82                50 - 136 U/L        Protein, total              8.8 (H)           6.3 - 8.2 g/*       Albumin                     3.3 (L)           3.5 - 5.0 g/*       Globulin                    5.5 (H)           2.3 - 3.5 g/*       A-G Ratio                   0.6 (L)           1.2 - 3.5      4. PROCALCITONIN       Result                      Value             Ref Range           Procalcitonin               13.73             ng/mL          5.  LIPASE       Result                      Value             Ref Range           Lipase                      93                73 - 393 U/L   6. URINE MICROSCOPIC       Result                      Value             Ref Range           WBC                         0-3               0 /hpf              RBC                         0                 0 /hpf              Epithelial cells            0-3               0 /hpf              Bacteria                    0                 0 /hpf              Casts                       0-3               0 /lpf             Patient Progress  Patient progress: stable         Procedures

## 2021-08-20 NOTE — H&P
H&P/Consult Note/Progress Note/Office Note:   Erika Clinton  MRN: [id number]  :1990  Age:31 y.o.    HPI: Erika Clinton is a 32 y.o. male who came to the emergency room on 2021 with a 3-day history of constant, severe, progressive RLQ abd pain. Nothing in particular made his pain better or worse. He had nausea but no vomiting. He had associated fever  He denied prior abdominal surgery. He had a markedly elevated white count and a CT scan shown below which identified findings consistent with acute appendicitis. General surgery was consulted. 21 CT abd/pelvis with IV contrast  There is a dilated, inflamed appendix that is fluid-filled with marked para appendiceal fat inflammation. The appendix measures 10 mm in transverse dimension. No periappendiceal abscess or free air present to strongly suspect perforation.     The liver, spleen, pancreas and kidneys are normal. There is no hydronephrosis. The gallbladder is normal. The adrenal glands are normal. The bowel is normal in course, caliber, and wall thickness.     The rectum is normal. The bladder is normal. No interval hernia or adenopathy. No free fluid accumulating dependently in the pelvis. o aggressive osseous lesions identified.     IMPRESSION  Acute appendicitis with marked periappendiceal inflammatory fat stranding in RLQ   without definite abscess or extraluminal air to indicate perforation.           No past medical history on file. No past surgical history on file.   Current Facility-Administered Medications   Medication Dose Route Frequency    sodium chloride (NS) flush 5-10 mL  5-10 mL IntraVENous Q8H    sodium chloride (NS) flush 5-10 mL  5-10 mL IntraVENous PRN    metroNIDAZOLE (FLAGYL) IVPB premix 500 mg  500 mg IntraVENous ONCE    piperacillin-tazobactam (ZOSYN) 4.5 g in 0.9% sodium chloride (MBP/ADV) 100 mL MBP  4.5 g IntraVENous Q6H    famotidine (PF) (PEPCID) 20 mg in 0.9% sodium chloride 10 mL injection 20 mg IntraVENous Q12H    lactated Ringers infusion  150 mL/hr IntraVENous CONTINUOUS     No current outpatient medications on file. Patient has no known allergies. Social History     Socioeconomic History    Marital status:      Spouse name: Not on file    Number of children: Not on file    Years of education: Not on file    Highest education level: Not on file     Social Determinants of Health     Financial Resource Strain:     Difficulty of Paying Living Expenses:    Food Insecurity:     Worried About Running Out of Food in the Last Year:     [address] in the Last Year:    Transportation Needs:     Lack of Transportation (Medical):  Lack of Transportation (Non-Medical):    Physical Activity:     Days of Exercise per Week:     Minutes of Exercise per Session:    Stress:     Feeling of Stress :    Social Connections:     Frequency of Communication with Friends and Family:     Frequency of Social Gatherings with Friends and Family:     Attends [employer] Services:     Active Member of Clubs or Organizations:     Attends Club or Organization Meetings:     Marital Status:      Social History     Tobacco Use   Smoking Status Not on file     No family history on file. ROS: The patient has no difficulty with chest pain or shortness of breath. No fever or chills. Comprehensive review of systems was otherwise unremarkable except as noted above. Physical Exam:   Visit Vitals  /70   Pulse 96   Temp (!) 102.8 °F (39.3 °C)   Resp 22   Ht 5' 4\" (1.626 m)   Wt 170 lb (77.1 kg)   SpO2 98%   BMI 29.18 kg/m²     Vitals:    08/20/21 1614 08/20/21 1653 08/20/21 1738 08/20/21 2001   BP:  (!) 144/72 (!) 115/55 118/70   Pulse:       Resp:       Temp:       SpO2:  98% 97% 98%   Weight: 170 lb (77.1 kg)      Height: 5' 4\" (1.626 m)        No intake/output data recorded.   08/19 0701 - 08/20 1900  In: 50 [I.V.:50]  Out: -     Constitutional: Alert, oriented, cooperative patient in no acute distress; appears stated age    Eyes:Sclera are clear. EOMs intact  ENMT: no external lesions gross hearing normal; no obvious neck masses, no ear or lip lesions, nares normal  CV: RRR. Normal perfusion  Resp: No JVD. Breathing is  non-labored; no audible wheezing. GI: soft and non-distended; +RLQ guarding     Musculoskeletal: unremarkable with normal function. No embolic signs or cyanosis. Neuro:  Oriented; moves all 4; no focal deficits  Psychiatric: normal affect and mood, no memory impairment    Recent vitals (if inpt):  Patient Vitals for the past 24 hrs:   BP Temp Pulse Resp SpO2 Height Weight   08/20/21 2001 118/70    98 %     08/20/21 1738 (!) 115/55    97 %     08/20/21 1653 (!) 144/72    98 %     08/20/21 1614      5' 4\" (1.626 m) 170 lb (77.1 kg)   08/20/21 1613 133/76 (!) 102.8 °F (39.3 °C) 96 22 99 %         Amount and/or Complexity of Data Reviewed and Analyzed:  I reviewed and analyzed all of the unique labs and radiologic studies that are shown below as well as any that are in the HPI, and any that are in the expanded problem list below  *Each unique test, order, or document contributes to the combination of 2 or combination of 3 in Category 1 below. For this visit I also reviewed old records and prior notes.       Recent Labs     08/20/21  1623 08/20/21  1617   WBC 19.8*  --    HGB 16.0  --      --      --    K 3.5  --      --    CO2 28  --    BUN 10  --    CREA 1.06  --    *  --    TBILI 1.5*  --    ALT 36  --    AP 82  --    LPSE  --  93     Review of most recent CBC  Lab Results   Component Value Date/Time    WBC 19.8 (H) 08/20/2021 04:23 PM    HGB 16.0 08/20/2021 04:23 PM    HCT 46.7 08/20/2021 04:23 PM    PLATELET 984 01/69/3427 04:23 PM    MCV 87.6 08/20/2021 04:23 PM       Review of most recent BMP  Lab Results   Component Value Date/Time    Sodium 138 08/20/2021 04:23 PM    Potassium 3.5 08/20/2021 04:23 PM    Chloride 105 08/20/2021 04:23 PM    CO2 28 08/20/2021 04:23 PM    Anion gap 5 (L) 08/20/2021 04:23 PM    Glucose 112 (H) 08/20/2021 04:23 PM    BUN 10 08/20/2021 04:23 PM    Creatinine 1.06 08/20/2021 04:23 PM    GFR est AA >60 08/20/2021 04:23 PM    GFR est non-AA >60 08/20/2021 04:23 PM    Calcium 9.0 08/20/2021 04:23 PM       Review of most recent LFTs (and lipase if done)  Lab Results   Component Value Date/Time    ALT (SGPT) 36 08/20/2021 04:23 PM    AST (SGOT) 16 08/20/2021 04:23 PM    Alk. phosphatase 82 08/20/2021 04:23 PM    Bilirubin, total 1.5 (H) 08/20/2021 04:23 PM     Lab Results   Component Value Date/Time    Lipase 93 08/20/2021 04:17 PM       No results found for: INR, APTT, CBIL, LCAD, NH4, TROPT, TROIQ, INREXT, INREXT    Review of most recent HgbA1c  No results found for: HBA1C, FEJ9ZMDF, ALQ9OMPE, ZJQ5JYDD    Nutritional assessment screen for wound healing issues:  Lab Results   Component Value Date/Time    Protein, total 8.8 (H) 08/20/2021 04:23 PM    Albumin 3.3 (L) 08/20/2021 04:23 PM       @lastcovr@  XR Results (most recent):  Results from Hospital Encounter encounter on 08/20/21    XR CHEST PORT    Narrative  Single view chest x-ray    Clinical indication: Epigastric abdominal pain, sepsis    FINDINGS: Single view of the chest show the lungs to be expanded and clear. No  pleural effusion or pneumothorax. The cardiac silhouette and mediastinum are  unremarkable. The bones are normal.    Impression  Normal single view chest x-ray. CT Results (most recent):  Results from Hospital Encounter encounter on 08/20/21    CT ABD PELV W CONT    Narrative  CT OF THE ABDOMEN AND PELVIS WITH CONTRAST.     CLINICAL INDICATION: Right lower quadrant abdominal pain for two days, fever,  elevated white blood cell count    PROCEDURE: Serial thin section axial images obtained from the lung bases through  the proximal femurs following the administration of 100 cc of Isovue 370  intravenous contrast.  Radiation dose reduction techniques were used for this  study. Our CT scanners use one or all of the following: Automated exposure  control, adjusted of the mA and/or kV according to patient size, iterative  reconstruction    COMPARISON: No prior    FINDINGS:    CT ABDOMEN: There is a dilated, inflamed appendix that is fluid-filled with  marked para appendiceal fat inflammation. The appendix measures 10 mm in  transverse dimension. No periappendiceal abscess or free air present to strongly  suspect perforation. The liver, spleen, pancreas and kidneys are normal. There is no hydronephrosis. The gallbladder is normal. The adrenal glands are normal. The bowel is normal in  course, caliber, and wall thickness. CT PELVIS: The rectum is normal. The bladder is normal. No interval hernia or  adenopathy. No free fluid accumulating dependently in the pelvis. No aggressive osseous lesions identified. Impression  Acute appendicitis with marked periappendiceal inflammatory fat  stranding in the right lower abdominal quadrant without definite abscess or  extraluminal air to indicate perforation. These important findings were called to the emergency room physician Dr. Stella Nyhan  by Dr. Con Murillo at 6:50 PM on 8/20/2021    US Results (most recent):  No results found for this or any previous visit.         Admission date (for inpatients): 8/20/2021   * No surgery date entered *  Procedure(s):  APPENDECTOMY LAPAROSCOPIC        ASSESSMENT/PLAN:  Problem List  Date Reviewed: 8/20/2021        Codes Class Noted    * (Principal) Acute appendicitis ICD-10-CM: K35.80  ICD-9-CM: 540.9  8/20/2021        RLQ abdominal pain ICD-10-CM: R10.31  ICD-9-CM: 789.03  8/20/2021        Fever ICD-10-CM: R50.9  ICD-9-CM: 780.60  8/20/2021        Right lower quadrant guarding ICD-10-CM: R19.8  ICD-9-CM: 789.9  8/20/2021        Leukocytosis ICD-10-CM: V91.043  ICD-9-CM: 288.60  8/20/2021            Principal Problem:    Acute appendicitis (8/20/2021)    Active Problems: RLQ abdominal pain (8/20/2021)      Fever (8/20/2021)      Right lower quadrant guarding (8/20/2021)      Leukocytosis (8/20/2021)           Number and Complexity of Problems addressed and   Risks of complications and/or morbidity of management        Fever, leukocytosis, RLQ pain, +guarding, CT evidence for acute appendicitis  I admitted the patient for observation status  LR at 150cc/hr  IV Zosyn  IV Pepcid  SCDs      I discussed the patient's condition with the patient at length and treatment options. I discussed risks of surgery (laparoscopic appendectomy)  in language the patient could understand including bleeding, infection, need for further surgery, abscess, SBO, blood clots, risk of anesthesia, etc.     The patient voiced understanding of all this and all questions were answered. Alternatives to surgery were discussed also and risks of the alternatives. The patient requested that we proceed with surgery. Informed consent was obtained. The OR has been notified                Level of MDM (2/3 elements below)  Number and Complexity of Problems Addressed Amount and/or Complexity of Data to be Reviewed and Analyzed  *Each unique test, order, or document contributes to the combination of 2 or combination of 3 in Category 1 below.  Risk of Complications and/or Morbidity or Mortality of pt Management     46208  42450 SF Minimal  1self-limited or minor problem Minimal or none Minimal risk of morbidity from additional diagnostic testing or Rx   95142  77856 Low Low  2or more self-limited or minor problems;    or  1stable chronic illness;    or  2ZGFWT, uncomplicated illness or injury   Limited  (Must meet the requirements of at least 1 of the 2 categories)  Category 1: Tests and documents   Any combination of 2 from the following:  Review of prior external note(s) from each unique source*;  review of the result(s) of each unique test*;   ordering of each unique test*    or   Category 2: Assessment requiring an independent historian(s)  (For the categories of independent interpretation of tests and discussion of management or test interpretation, see moderate or high) Low risk of morbidity from additional diagnostic testing or treatment     11774  55627 Mod Moderate  1or more chronic illnesses with exacerbation, progression, or side effects of treatment;    or  2or more stable chronic illnesses;    or  1undiagnosed new problem with uncertain prognosis;    or  1acute illness with systemic symptoms;    or  3SWZBI complicated injury   Moderate  (Must meet the requirements of at least 1 out of 3 categories)  Category 1: Tests, documents, or independent historian(s)  Any combination of 3 from the following:   Review of prior external note(s) from each unique source*;  Review of the result(s) of each unique test*;  Ordering of each unique test*;  Assessment requiring an independent historian(s)    or  Category 2: Independent interpretation of tests   Independent interpretation of a test performed by another physician/other qualified health care professional (not separately reported);     or  Category 3: Discussion of management or test interpretation  Discussion of management or test interpretation with external physician/other qualified health care professional/appropriate source (not separately reported)   Moderate risk of morbidity from additional diagnostic testing or treatment  Examples only:  Prescription drug management   Decision regarding minor surgery with identified patient or procedure risk factors  Decision regarding elective major surgery without identified patient or procedure risk factors   Diagnosis or treatment significantly limited by social determinants of health       69633  06122 High High  1or more chronic illnesses with severe exacerbation, progression, or side effects of treatment;    or  1 acute or chronic illness or injury that poses a threat to life or bodily function   Extensive  (Must meet the requirements of at least 2 out of 3 categories)  Category 1: Tests, documents, or independent historian(s)  Any combination of 3 from the following:   Review of prior external note(s) from each unique source*;  Review of the result(s) of each unique test*;   Ordering of each unique test*;   Assessment requiring an independent historian(s)    or   Category 2: Independent interpretation of tests   Independent interpretation of a test performed by another physician/other qualified health care professional (not separately reported);     or  Category 3: Discussion of management or test interpretation  Discussion of management or test interpretation with external physician/other qualified health care professional/appropriate source (not separately reported)   High risk of morbidity from additional diagnostic testing or treatment  Examples only:  Drug therapy requiring intensive monitoring for toxicity  Decision regarding elective major surgery with identified patient or procedure risk factors  Decision regarding emergency major surgery  Decision regarding hospitalization  Decision not to resuscitate or to de-escalate care because of poor prognosis             I have personally performed a face-to-face diagnostic evaluation and management  service on this patient. I have independently seen the patient. I have independently obtained the above history from the patient/family. I have independently examined the patient with above findings. I have independently reviewed data/labs for this patient and developed the above plan of care (MDM). Signed: Monica Nova.  Ladan Diamond MD, FACS

## 2021-08-21 LAB
ANION GAP SERPL CALC-SCNC: 5 MMOL/L (ref 7–16)
BUN SERPL-MCNC: 9 MG/DL (ref 6–23)
CALCIUM SERPL-MCNC: 8.1 MG/DL (ref 8.3–10.4)
CHLORIDE SERPL-SCNC: 105 MMOL/L (ref 98–107)
CO2 SERPL-SCNC: 29 MMOL/L (ref 21–32)
CREAT SERPL-MCNC: 0.96 MG/DL (ref 0.8–1.5)
ERYTHROCYTE [DISTWIDTH] IN BLOOD BY AUTOMATED COUNT: 13 % (ref 11.9–14.6)
GLUCOSE SERPL-MCNC: 114 MG/DL (ref 65–100)
HCT VFR BLD AUTO: 39.2 % (ref 41.1–50.3)
HGB BLD-MCNC: 13.1 G/DL (ref 13.6–17.2)
MCH RBC QN AUTO: 29.4 PG (ref 26.1–32.9)
MCHC RBC AUTO-ENTMCNC: 33.4 G/DL (ref 31.4–35)
MCV RBC AUTO: 88.1 FL (ref 79.6–97.8)
NRBC # BLD: 0 K/UL (ref 0–0.2)
PLATELET # BLD AUTO: 160 K/UL (ref 150–450)
PMV BLD AUTO: 10.5 FL (ref 9.4–12.3)
POTASSIUM SERPL-SCNC: 3.2 MMOL/L (ref 3.5–5.1)
RBC # BLD AUTO: 4.45 M/UL (ref 4.23–5.6)
SODIUM SERPL-SCNC: 139 MMOL/L (ref 138–145)
WBC # BLD AUTO: 12.8 K/UL (ref 4.3–11.1)

## 2021-08-21 PROCEDURE — 74011000258 HC RX REV CODE- 258: Performed by: SURGERY

## 2021-08-21 PROCEDURE — 80048 BASIC METABOLIC PNL TOTAL CA: CPT

## 2021-08-21 PROCEDURE — 85027 COMPLETE CBC AUTOMATED: CPT

## 2021-08-21 PROCEDURE — 74011250637 HC RX REV CODE- 250/637: Performed by: NURSE PRACTITIONER

## 2021-08-21 PROCEDURE — 36415 COLL VENOUS BLD VENIPUNCTURE: CPT

## 2021-08-21 PROCEDURE — 74011250636 HC RX REV CODE- 250/636: Performed by: SURGERY

## 2021-08-21 PROCEDURE — 74011000250 HC RX REV CODE- 250: Performed by: SURGERY

## 2021-08-21 PROCEDURE — 74011250636 HC RX REV CODE- 250/636: Performed by: NURSE PRACTITIONER

## 2021-08-21 PROCEDURE — 99218 HC RM OBSERVATION: CPT

## 2021-08-21 RX ORDER — ACETAMINOPHEN 500 MG
1000 TABLET ORAL
Status: DISCONTINUED | OUTPATIENT
Start: 2021-08-21 | End: 2021-08-25 | Stop reason: HOSPADM

## 2021-08-21 RX ORDER — DEXTROSE, SODIUM CHLORIDE, AND POTASSIUM CHLORIDE 5; .45; .15 G/100ML; G/100ML; G/100ML
30 INJECTION INTRAVENOUS CONTINUOUS
Status: DISCONTINUED | OUTPATIENT
Start: 2021-08-21 | End: 2021-08-24

## 2021-08-21 RX ORDER — MORPHINE SULFATE 2 MG/ML
2-6 INJECTION, SOLUTION INTRAMUSCULAR; INTRAVENOUS
Status: DISCONTINUED | OUTPATIENT
Start: 2021-08-21 | End: 2021-08-23

## 2021-08-21 RX ORDER — ONDANSETRON 2 MG/ML
4 INJECTION INTRAMUSCULAR; INTRAVENOUS
Status: DISCONTINUED | OUTPATIENT
Start: 2021-08-21 | End: 2021-08-25 | Stop reason: HOSPADM

## 2021-08-21 RX ORDER — HYDROMORPHONE HYDROCHLORIDE 1 MG/ML
2-6 INJECTION, SOLUTION INTRAMUSCULAR; INTRAVENOUS; SUBCUTANEOUS
Status: CANCELLED | OUTPATIENT
Start: 2021-08-21

## 2021-08-21 RX ORDER — OXYCODONE HYDROCHLORIDE 5 MG/1
5-10 TABLET ORAL
Status: DISCONTINUED | OUTPATIENT
Start: 2021-08-21 | End: 2021-08-25 | Stop reason: HOSPADM

## 2021-08-21 RX ADMIN — ACETAMINOPHEN 1000 MG: 500 TABLET, FILM COATED ORAL at 07:29

## 2021-08-21 RX ADMIN — PIPERACILLIN AND TAZOBACTAM 4.5 G: 4; .5 INJECTION, POWDER, FOR SOLUTION INTRAVENOUS at 14:07

## 2021-08-21 RX ADMIN — OXYCODONE 10 MG: 5 TABLET ORAL at 16:27

## 2021-08-21 RX ADMIN — DEXTROSE MONOHYDRATE, SODIUM CHLORIDE, AND POTASSIUM CHLORIDE 150 ML/HR: 50; 4.5; 1.49 INJECTION, SOLUTION INTRAVENOUS at 21:36

## 2021-08-21 RX ADMIN — OXYCODONE 10 MG: 5 TABLET ORAL at 02:07

## 2021-08-21 RX ADMIN — FAMOTIDINE 20 MG: 10 INJECTION INTRAVENOUS at 01:23

## 2021-08-21 RX ADMIN — ACETAMINOPHEN 1000 MG: 500 TABLET, FILM COATED ORAL at 20:23

## 2021-08-21 RX ADMIN — FAMOTIDINE 20 MG: 10 INJECTION INTRAVENOUS at 23:23

## 2021-08-21 RX ADMIN — PIPERACILLIN AND TAZOBACTAM 4.5 G: 4; .5 INJECTION, POWDER, FOR SOLUTION INTRAVENOUS at 08:04

## 2021-08-21 RX ADMIN — SODIUM CHLORIDE, SODIUM LACTATE, POTASSIUM CHLORIDE, AND CALCIUM CHLORIDE 1000 ML: 600; 310; 30; 20 INJECTION, SOLUTION INTRAVENOUS at 10:00

## 2021-08-21 RX ADMIN — PIPERACILLIN AND TAZOBACTAM 4.5 G: 4; .5 INJECTION, POWDER, FOR SOLUTION INTRAVENOUS at 20:19

## 2021-08-21 RX ADMIN — PIPERACILLIN AND TAZOBACTAM 4.5 G: 4; .5 INJECTION, POWDER, FOR SOLUTION INTRAVENOUS at 02:11

## 2021-08-21 RX ADMIN — MORPHINE SULFATE 2 MG: 2 INJECTION, SOLUTION INTRAMUSCULAR; INTRAVENOUS at 03:31

## 2021-08-21 RX ADMIN — OXYCODONE 10 MG: 5 TABLET ORAL at 07:30

## 2021-08-21 RX ADMIN — DEXTROSE MONOHYDRATE, SODIUM CHLORIDE, AND POTASSIUM CHLORIDE 150 ML/HR: 50; 4.5; 1.49 INJECTION, SOLUTION INTRAVENOUS at 14:07

## 2021-08-21 RX ADMIN — ACETAMINOPHEN 1000 MG: 500 TABLET, FILM COATED ORAL at 13:32

## 2021-08-21 RX ADMIN — FAMOTIDINE 20 MG: 10 INJECTION INTRAVENOUS at 08:04

## 2021-08-21 RX ADMIN — OXYCODONE 10 MG: 5 TABLET ORAL at 23:30

## 2021-08-21 NOTE — PROGRESS NOTES
08/21/21 1841   Vital Signs   Temp (!) 101.7 °F (38.7 °C)   Temp Source Oral   Pulse (Heart Rate) (!) 118   Heart Rate Source Monitor   Resp Rate 18   O2 Sat (%) 97 %   Level of Consciousness Alert (0)   /73   MAP (Calculated) 88   MEWS Score 5       Frommel NP notified of Patient's recent vital signs. Patient alert and oriented. Denies having any pain or discomfort. Educated patient on use of IS. No new orders at this time, continue to monitor. Report off to night shift, Brie ROJAS.

## 2021-08-21 NOTE — ANESTHESIA POSTPROCEDURE EVALUATION
Procedure(s):  LAPAROSCOPIC APPENDECTOMY. general    Anesthesia Post Evaluation      Multimodal analgesia: multimodal analgesia used between 6 hours prior to anesthesia start to PACU discharge  Patient location during evaluation: PACU  Patient participation: complete - patient participated  Level of consciousness: awake and alert  Pain management: adequate  Airway patency: patent  Anesthetic complications: no  Cardiovascular status: acceptable  Respiratory status: acceptable  Hydration status: acceptable  Comments: Fever treated with acetaminophen  Post anesthesia nausea and vomiting:  none  Final Post Anesthesia Temperature Assessment:  Hyperthermia >37.5 degrees C)      INITIAL Post-op Vital signs:   Vitals Value Taken Time   /55 08/20/21 2219   Temp 38 °C (100.4 °F) 08/20/21 2150   Pulse 112 08/20/21 2221   Resp 14 08/20/21 2214   SpO2 93 % 08/20/21 2221   Vitals shown include unvalidated device data.

## 2021-08-21 NOTE — ED NOTES
TRANSFER - OUT REPORT:    Verbal report given to BOB Dwyer on [employer]  being transferred to Pre-op for ordered procedure       Report consisted of patients Situation, Background, Assessment and   Recommendations(SBAR). Information from the following report(s) SBAR, Kardex, ED Summary, Intake/Output, MAR and Recent Results was reviewed with the receiving nurse. Lines:   Peripheral IV 08/20/21 Distal;Left (Active)       Peripheral IV 08/20/21 Left Hand (Active)   Site Assessment Clean, dry, & intact 08/20/21 1958   Phlebitis Assessment 0 08/20/21 1958   Infiltration Assessment 0 08/20/21 1958   Dressing Status Clean, dry, & intact 08/20/21 1958   Dressing Type Transparent 08/20/21 1958   Hub Color/Line Status Blue 08/20/21 1958        Opportunity for questions and clarification was provided.       Patient transported with:   Registered Nurse

## 2021-08-21 NOTE — ANESTHESIA PREPROCEDURE EVALUATION
Relevant Problems   No relevant active problems       Anesthetic History   No history of anesthetic complications            Review of Systems / Medical History  Patient summary reviewed and pertinent labs reviewed    Pulmonary            Asthma (Childhood asthma)        Neuro/Psych   Within defined limits           Cardiovascular  Within defined limits                Exercise tolerance: >4 METS     GI/Hepatic/Renal                Endo/Other  Within defined limits           Other Findings            Physical Exam    Airway  Mallampati: II  TM Distance: 4 - 6 cm  Neck ROM: normal range of motion   Mouth opening: Normal     Cardiovascular    Rhythm: regular  Rate: normal         Dental  No notable dental hx       Pulmonary  Breath sounds clear to auscultation               Abdominal  GI exam deferred       Other Findings            Anesthetic Plan    ASA: 1, emergent              Anesthetic plan and risks discussed with: Patient      Interviewed via

## 2021-08-21 NOTE — PROGRESS NOTES
END OF SHIFT NOTE:    INTAKE/OUTPUT  08/20 0701 - 08/21 0700  In: 098 [P.O.:120; I.V.:750]  Out: 210 [Urine:200; Drains:5]  Voiding: YES  Catheter: NO  Drain:   Jess Madden #1 08/20/21 Anterior; Lower Abdomen (Active)   Site Assessment Clean, dry, & intact 08/21/21 1415   Dressing Status Clean, dry, & intact 08/21/21 1415   Drainage Description Serosanguinous 08/21/21 1415   Jermaine Drain Airleak No 08/21/21 1415   Status Patent; Charged;Draining 08/21/21 1415   Y Connector Used No 08/21/21 1415   Output (ml) 20 ml 08/21/21 1415               Flatus: Patient does NOT have flatus present. Stool:  0 occurrences. Characteristics:  Stool Assessment  Stool Appearance: Formed    Emesis: 0 occurrences. Characteristics:        VITAL SIGNS  Patient Vitals for the past 12 hrs:   Temp Pulse Resp BP SpO2   08/21/21 1525 98.5 °F (36.9 °C) 98 18 108/63 99 %   08/21/21 1327 (!) 100.6 °F (38.1 °C)       08/21/21 1137 98.7 °F (37.1 °C) 86 19 (!) 102/47 99 %   08/21/21 0830 100 °F (37.8 °C)       08/21/21 0726 100.4 °F (38 °C)       08/21/21 0724 99 °F (37.2 °C) (!) 103 18 121/67 97 %       Pain Assessment  Pain Intensity 1: 4 (08/21/21 1637)  Pain Location 1: Abdomen  Pain Intervention(s) 1: Medication (see MAR)  Patient Stated Pain Goal: 0    Ambulating  Yes    Shift report given to oncoming nurse at the bedside.     Darwin Arnold RN

## 2021-08-21 NOTE — PERIOP NOTES
TRANSFER - OUT REPORT:    Verbal report given to [address] on [employer]  being transferred to room 216 for routine post - op       Report consisted of patients Situation, Background, Assessment and   Recommendations(SBAR). Information from the following report(s) SBAR, Kardex, OR Summary, Intake/Output and MAR was reviewed with the receiving nurse. Lines:   Peripheral IV 08/20/21 Distal;Left (Active)       Peripheral IV 08/20/21 Left Hand (Active)   Site Assessment Clean, dry, & intact 08/20/21 2150   Phlebitis Assessment 0 08/20/21 2150   Infiltration Assessment 0 08/20/21 2150   Dressing Status Clean, dry, & intact 08/20/21 2150   Dressing Type Transparent;Tape 08/20/21 2150   Hub Color/Line Status Blue; Infusing 08/20/21 2150        Opportunity for questions and clarification was provided. Patient transported with:   O2 @ 3 liters    VTE prophylaxis orders have been written for [employer]

## 2021-08-21 NOTE — PROGRESS NOTES
H&P/Consult Note/Progress Note/Office Note:   Bigg Begum  MRN: [id number]  :1990  Age:31 y.o.    HPI: Bigg Begum is a 32 y.o. male who is s/p lap appendectomy on 21. Prior to surgery he came to the ER on 21 with a 3day h/o constant, severe, progressive RLQ abd pain. Nothing in particular made his pain better or worse. He had nausea but no vomiting. He had associated fever  He denied prior abdominal surgery. He had a markedly elevated white count and a CT scan shown below which identified findings consistent with acute appendicitis. He had signs of sepsis          21 CT abd/pelvis with IV contrast  There is a dilated, inflamed appendix that is fluid-filled with marked para appendiceal fat inflammation. The appendix measures 10 mm in transverse dimension. No periappendiceal abscess or free air present to strongly suspect perforation.     The liver, spleen, pancreas and kidneys are normal. There is no hydronephrosis. The gallbladder is normal. The adrenal glands are normal. The bowel is normal in course, caliber, and wall thickness.     The rectum is normal. The bladder is normal. No interval hernia or adenopathy. No free fluid accumulating dependently in the pelvis. o aggressive osseous lesions identified.     IMPRESSION  Acute appendicitis with marked periappendiceal inflammatory fat stranding in RLQ   without definite abscess or extraluminal air to indicate perforation.          Additional hx:  21 POD1; Feels much better; still intermittent fever; WBC elevated but better; Cont IV Abx;  # [id number]               History reviewed. No pertinent past medical history. History reviewed. No pertinent surgical history.   Current Facility-Administered Medications   Medication Dose Route Frequency    ondansetron (ZOFRAN) injection 4 mg  4 mg IntraVENous Q4H PRN    morphine injection 2-6 mg  2-6 mg IntraVENous Q1H PRN    acetaminophen (TYLENOL) tablet 1,000 mg 1,000 mg Oral Q6H PRN    oxyCODONE IR (ROXICODONE) tablet 5-10 mg  5-10 mg Oral Q4H PRN    lactated ringers bolus infusion 1,000 mL  1,000 mL IntraVENous ONCE    piperacillin-tazobactam (ZOSYN) 4.5 g in 0.9% sodium chloride (MBP/ADV) 100 mL MBP  4.5 g IntraVENous Q6H    famotidine (PF) (PEPCID) 20 mg in 0.9% sodium chloride 10 mL injection  20 mg IntraVENous Q12H    lactated Ringers infusion  150 mL/hr IntraVENous CONTINUOUS     Patient has no known allergies. Social History     Socioeconomic History    Marital status:      Spouse name: Not on file    Number of children: Not on file    Years of education: Not on file    Highest education level: Not on file     Social Determinants of Health     Financial Resource Strain:     Difficulty of Paying Living Expenses:    Food Insecurity:     Worried About Running Out of Food in the Last Year:     [address] in the Last Year:    Transportation Needs:     Lack of Transportation (Medical):  Lack of Transportation (Non-Medical):    Physical Activity:     Days of Exercise per Week:     Minutes of Exercise per Session:    Stress:     Feeling of Stress :    Social Connections:     Frequency of Communication with Friends and Family:     Frequency of Social Gatherings with Friends and Family:     Attends Protestant Services:     Active Member of Clubs or Organizations:     Attends Club or Organization Meetings:     Marital Status:      Social History     Tobacco Use   Smoking Status Not on file     No family history on file. ROS: The patient has no difficulty with chest pain or shortness of breath. No fever or chills. Comprehensive review of systems was otherwise unremarkable except as noted above.     Physical Exam:   Visit Vitals  /67   Pulse (!) 103   Temp 100.4 °F (38 °C)   Resp 18   Ht 5' 4\" (1.626 m)   Wt 170 lb (77.1 kg)   SpO2 97%   BMI 29.18 kg/m²     Vitals:    08/20/21 2244 08/20/21 2247 08/21/21 0724 08/21/21 0726   BP: (!) 110/55  121/67    Pulse: (!) 102 (!) 105 (!) 103    Resp: 20  18    Temp: 99.9 °F (37.7 °C)  99 °F (37.2 °C) 100.4 °F (38 °C)   SpO2: 97% 96% 97%    Weight:       Height:         08/21 0701 - 08/21 1900  In: -   Out: 250 [Urine:250]  08/19 1901 - 08/21 0700  In: 246 [P.O.:120; I.V.:750]  Out: 210 [Urine:200; Drains:5]    Constitutional: Alert, oriented, cooperative patient in no acute distress; appears stated age    Eyes:Sclera are clear. EOMs intact  ENMT: no external lesions gross hearing normal; no obvious neck masses, no ear or lip lesions, nares normal  CV: RRR. Normal perfusion  Resp: No JVD. Breathing is  non-labored; no audible wheezing. GI: non-distended; Incisions OK; Jermiane drain serosang     Musculoskeletal: unremarkable with normal function. No embolic signs or cyanosis.    Neuro:  Oriented; moves all 4; no focal deficits  Psychiatric: normal affect and mood, no memory impairment    Recent vitals (if inpt):  Patient Vitals for the past 24 hrs:   BP Temp Pulse Resp SpO2 Height Weight   08/21/21 0726  100.4 °F (38 °C)        08/21/21 0724 121/67 99 °F (37.2 °C) (!) 103 18 97 %     08/20/21 2247   (!) 105  96 %     08/20/21 2244 (!) 110/55 99.9 °F (37.7 °C) (!) 102 20 97 %     08/20/21 2235   96  97 %     08/20/21 2224 (!) 124/56  (!) 112 18 95 %     08/20/21 2219 (!) 111/55  (!) 112 16 94 %     08/20/21 2214 (!) 110/54  (!) 108 14 94 %     08/20/21 2209 (!) 121/56  (!) 111 16 97 %     08/20/21 2203 (!) 112/59  (!) 109 18 95 %     08/20/21 2158 (!) 117/58  (!) 105 16 96 %     08/20/21 2153 (!) 126/59  (!) 106 14 98 %     08/20/21 2150 (!) 154/69 100.4 °F (38 °C) (!) 111 12 98 %     08/20/21 2148   (!) 114  99 %     08/20/21 2001 118/70 (!) 102.5 °F (39.2 °C) (!) 117  98 %     08/20/21 1738 (!) 115/55    97 %     08/20/21 1653 (!) 144/72    98 %     08/20/21 1614      5' 4\" (1.626 m) 170 lb (77.1 kg)   08/20/21 1613 133/76 (!) 102.8 °F (39.3 °C) 96 22 99 %         Amount and/or Complexity of Data Reviewed and Analyzed:  I reviewed and analyzed all of the unique labs and radiologic studies that are shown below as well as any that are in the HPI, and any that are in the expanded problem list below  *Each unique test, order, or document contributes to the combination of 2 or combination of 3 in Category 1 below. For this visit I also reviewed old records and prior notes. Recent Labs     08/20/21  1623 08/20/21  1617   WBC 19.8*  --    HGB 16.0  --      --      --    K 3.5  --      --    CO2 28  --    BUN 10  --    CREA 1.06  --    *  --    TBILI 1.5*  --    ALT 36  --    AP 82  --    LPSE  --  93     Review of most recent CBC  Lab Results   Component Value Date/Time    WBC 19.8 (H) 08/20/2021 04:23 PM    HGB 16.0 08/20/2021 04:23 PM    HCT 46.7 08/20/2021 04:23 PM    PLATELET 946 35/96/5372 04:23 PM    MCV 87.6 08/20/2021 04:23 PM       Review of most recent BMP  Lab Results   Component Value Date/Time    Sodium 138 08/20/2021 04:23 PM    Potassium 3.5 08/20/2021 04:23 PM    Chloride 105 08/20/2021 04:23 PM    CO2 28 08/20/2021 04:23 PM    Anion gap 5 (L) 08/20/2021 04:23 PM    Glucose 112 (H) 08/20/2021 04:23 PM    BUN 10 08/20/2021 04:23 PM    Creatinine 1.06 08/20/2021 04:23 PM    GFR est AA >60 08/20/2021 04:23 PM    GFR est non-AA >60 08/20/2021 04:23 PM    Calcium 9.0 08/20/2021 04:23 PM       Review of most recent LFTs (and lipase if done)  Lab Results   Component Value Date/Time    ALT (SGPT) 36 08/20/2021 04:23 PM    AST (SGOT) 16 08/20/2021 04:23 PM    Alk.  phosphatase 82 08/20/2021 04:23 PM    Bilirubin, total 1.5 (H) 08/20/2021 04:23 PM     Lab Results   Component Value Date/Time    Lipase 93 08/20/2021 04:17 PM       No results found for: INR, APTT, CBIL, LCAD, NH4, TROPT, TROIQ, INREXT, INREXT    Review of most recent HgbA1c  No results found for: HBA1C, DIS7QWRQ, AUO7WQXP, UKS2DNZM    Nutritional assessment screen for wound healing issues:  Lab Results   Component Value Date/Time    Protein, total 8.8 (H) 08/20/2021 04:23 PM    Albumin 3.3 (L) 08/20/2021 04:23 PM       @lastcovr@  XR Results (most recent):  Results from Hospital Encounter encounter on 08/20/21    XR CHEST PORT    Narrative  Single view chest x-ray    Clinical indication: Epigastric abdominal pain, sepsis    FINDINGS: Single view of the chest show the lungs to be expanded and clear. No  pleural effusion or pneumothorax. The cardiac silhouette and mediastinum are  unremarkable. The bones are normal.    Impression  Normal single view chest x-ray. CT Results (most recent):  Results from Hospital Encounter encounter on 08/20/21    CT ABD PELV W CONT    Narrative  CT OF THE ABDOMEN AND PELVIS WITH CONTRAST. CLINICAL INDICATION: Right lower quadrant abdominal pain for two days, fever,  elevated white blood cell count    PROCEDURE: Serial thin section axial images obtained from the lung bases through  the proximal femurs following the administration of 100 cc of Isovue 370  intravenous contrast.  Radiation dose reduction techniques were used for this  study. Our CT scanners use one or all of the following: Automated exposure  control, adjusted of the mA and/or kV according to patient size, iterative  reconstruction    COMPARISON: No prior    FINDINGS:    CT ABDOMEN: There is a dilated, inflamed appendix that is fluid-filled with  marked para appendiceal fat inflammation. The appendix measures 10 mm in  transverse dimension. No periappendiceal abscess or free air present to strongly  suspect perforation. The liver, spleen, pancreas and kidneys are normal. There is no hydronephrosis. The gallbladder is normal. The adrenal glands are normal. The bowel is normal in  course, caliber, and wall thickness. CT PELVIS: The rectum is normal. The bladder is normal. No interval hernia or  adenopathy.  No free fluid accumulating dependently in the pelvis. No aggressive osseous lesions identified. Impression  Acute appendicitis with marked periappendiceal inflammatory fat  stranding in the right lower abdominal quadrant without definite abscess or  extraluminal air to indicate perforation. These important findings were called to the emergency room physician Dr. Cornell Silveira  by Dr. Claude Belfast at 6:50 PM on 8/20/2021    US Results (most recent):  No results found for this or any previous visit. Admission date (for inpatients): 8/20/2021   * No surgery date entered *  Procedure(s):  LAPAROSCOPIC APPENDECTOMY        ASSESSMENT/PLAN:  Problem List  Date Reviewed: 8/20/2021        Codes Class Noted    Acute appendicitis ICD-10-CM: K35.80  ICD-9-CM: 540.9  8/20/2021    Overview Signed 8/20/2021  8:15 PM by Sarbjit Jimenes MD     8/20/21 s/p lap appendectomy; Dr Cook Courser abdominal pain ICD-10-CM: R10.31  ICD-9-CM: 789.03  8/20/2021        Fever ICD-10-CM: R50.9  ICD-9-CM: 780.60  8/20/2021        Right lower quadrant guarding ICD-10-CM: R19.8  ICD-9-CM: 789.9  8/20/2021        Leukocytosis ICD-10-CM: D72.829  ICD-9-CM: 288.60  8/20/2021        Tachycardia ICD-10-CM: R00.0  ICD-9-CM: 785.0  8/20/2021        * (Principal) Sepsis ([address] ICD-10-CM: A41.9  ICD-9-CM: 038.9, 995.91  8/20/2021            Principal Problem:    Sepsis ([address] (8/20/2021)    Active Problems:    Acute appendicitis (8/20/2021)      Overview: 8/20/21 s/p lap appendectomy; Dr Brendon Matta      RLQ abdominal pain (8/20/2021)      Fever (8/20/2021)      Right lower quadrant guarding (8/20/2021)      Leukocytosis (8/20/2021)      Tachycardia (8/20/2021)           Number and Complexity of Problems addressed and   Risks of complications and/or morbidity of management        Sepsis with Fever, leukocytosis, RLQ pain, +guarding, CT evidence for acute appendicitis  He is s/p lap appendectomy on 8/20/21.   He has sepsis    Change admission status to inpt    Clears  IVF  LR bolus + LR at 150cc/hr  IV zosyn  Bld cultured pending  Daily labs  Drain teaching done and post-op instructions done with pt using with  # [id number]    IV Pepcid  SCDs              I have personally performed a face-to-face diagnostic evaluation and management  service on this patient. I have independently seen the patient. I have independently obtained the above history from the patient/family. I have independently examined the patient with above findings. I have independently reviewed data/labs for this patient and developed the above plan of care (MDM). Signed: Amy Mason.  Gia Calvillo MD, FACS

## 2021-08-21 NOTE — OP NOTES
[address] [facility]  [phone]    OPERATVE REPORT    Name: María Yepez     Date of Surgery: 8/20/2021  Med Record Number: [id number]   Age: 32 y.o. Sex: male   Pre-operative Diagnosis:   Acute Appendicitis  RLQ pain  +guarding  Leukocytosis  Fever    Post-operative Diagnosis: same    Procedure: Laparoscopic Appendectomy (CPT 54938)    Surgeon: Megan Griffith MD  Assistants: none  Anesthesia:  General  Complications: none  Specimens:  appendix to path  Estimated Blood Loss:  <30cc    OPERATIVE PROCEDURE: The risks, benefits, potential complications,  treatment options and expected outcomes were discussed with the patient  and/or patient family members preoperatively. The possibilities of  reactions to medications, chronic pain, bleeding, infection, abscess and  injury to internal organs including bowel, fistula, blood clots, hernia,  the need for further surgeries or procedures, open surgery, etc...were discussed and all the patient's and/or patient's family members  questions were answered. Understanding was voiced and informed consent  was obtained preoperatively. The patient was taken to the operating room  and [facility] time-out protocol check list was followed. After induction of general anesthesia, the abdomen was prepped and draped  in the usual fashion. The patient was opened through a subumbilical cut-down incision  and a Tomasz trocar was inserted under direct vision. After adequate  pneumoperitoneum, 5 mm and 12 mm trocars were placed in the usual  locations to help triangulate over the appendix. The appendix ischemic, acutely inflamed and had early necrosis. No katie rupture. was mobilized. There was significant swelling and some reversible appearing ischemic injury the the surrounding ileum and cecal walls from the appendicitis. These structures were in the process of walling off the appendicitis.   The appendix was mobilized carefully. The mesoappendix was divided with a LigaSure device. This was carried  down to the base of the appendix. The appendix was divided from its insertion in the cecum with a laparoscopic NICOLE blue stapler and the appendix was then  placed within an Endo Catch bag and retracted out through the umbilical trocar  site with the camera having been reinserted through the 12 mm trocar  site, which had been placed to the right of midline. After a large amount of  irrigation of the intraabdominal cavity and confirmation of adequate  hemostasis and inspection of the cecal stump to confirm that the clips  were intact with good closure, Marcaine was infiltrated into each  trocar site. The trocars were withdrawn without active bleeding. The  fascia at the umbilical level was closed with interrupted 0 Vicryl sutures. The  fascia at the 12 mm trocar site was closed with Vicryl suture. The skin  was closed with clips. Sterile dressings were placed. The patient was  taken to recovery in good condition.     Asif Urbina MD, FACS

## 2021-08-22 LAB
ACC. NO. FROM MICRO ORDER, ACCP: ABNORMAL
ANION GAP SERPL CALC-SCNC: 6 MMOL/L (ref 7–16)
BUN SERPL-MCNC: 7 MG/DL (ref 6–23)
CALCIUM SERPL-MCNC: 7.6 MG/DL (ref 8.3–10.4)
CHLORIDE SERPL-SCNC: 105 MMOL/L (ref 98–107)
CO2 SERPL-SCNC: 26 MMOL/L (ref 21–32)
CREAT SERPL-MCNC: 0.95 MG/DL (ref 0.8–1.5)
ERYTHROCYTE [DISTWIDTH] IN BLOOD BY AUTOMATED COUNT: 12.9 % (ref 11.9–14.6)
GLUCOSE SERPL-MCNC: 135 MG/DL (ref 65–100)
HCT VFR BLD AUTO: 38.3 % (ref 41.1–50.3)
HGB BLD-MCNC: 12.6 G/DL (ref 13.6–17.2)
INTERPRETATION: ABNORMAL
MCH RBC QN AUTO: 28.6 PG (ref 26.1–32.9)
MCHC RBC AUTO-ENTMCNC: 32.9 G/DL (ref 31.4–35)
MCV RBC AUTO: 87 FL (ref 79.6–97.8)
NRBC # BLD: 0 K/UL (ref 0–0.2)
PLATELET # BLD AUTO: 146 K/UL (ref 150–450)
PMV BLD AUTO: 11.1 FL (ref 9.4–12.3)
POTASSIUM SERPL-SCNC: 3.1 MMOL/L (ref 3.5–5.1)
RBC # BLD AUTO: 4.4 M/UL (ref 4.23–5.6)
SODIUM SERPL-SCNC: 137 MMOL/L (ref 138–145)
STREPTOCOCCUS , STPSP: DETECTED
WBC # BLD AUTO: 6.9 K/UL (ref 4.3–11.1)

## 2021-08-22 PROCEDURE — 80048 BASIC METABOLIC PNL TOTAL CA: CPT

## 2021-08-22 PROCEDURE — 74011250637 HC RX REV CODE- 250/637: Performed by: NURSE PRACTITIONER

## 2021-08-22 PROCEDURE — 74011250636 HC RX REV CODE- 250/636: Performed by: SURGERY

## 2021-08-22 PROCEDURE — 96376 TX/PRO/DX INJ SAME DRUG ADON: CPT

## 2021-08-22 PROCEDURE — 74011000258 HC RX REV CODE- 258: Performed by: SURGERY

## 2021-08-22 PROCEDURE — 99218 HC RM OBSERVATION: CPT

## 2021-08-22 PROCEDURE — 96374 THER/PROPH/DIAG INJ IV PUSH: CPT

## 2021-08-22 PROCEDURE — 85027 COMPLETE CBC AUTOMATED: CPT

## 2021-08-22 PROCEDURE — 36415 COLL VENOUS BLD VENIPUNCTURE: CPT

## 2021-08-22 PROCEDURE — 96375 TX/PRO/DX INJ NEW DRUG ADDON: CPT

## 2021-08-22 PROCEDURE — 74011000250 HC RX REV CODE- 250: Performed by: SURGERY

## 2021-08-22 RX ORDER — FLUCONAZOLE 2 MG/ML
400 INJECTION, SOLUTION INTRAVENOUS EVERY 24 HOURS
Status: DISCONTINUED | OUTPATIENT
Start: 2021-08-22 | End: 2021-08-23

## 2021-08-22 RX ORDER — AMOXICILLIN AND CLAVULANATE POTASSIUM 875; 125 MG/1; MG/1
1 TABLET, FILM COATED ORAL EVERY 12 HOURS
Qty: 28 TABLET | Refills: 0 | Status: SHIPPED | OUTPATIENT
Start: 2021-08-22 | End: 2021-08-25

## 2021-08-22 RX ORDER — VANCOMYCIN 2 GRAM/500 ML IN 0.9 % SODIUM CHLORIDE INTRAVENOUS
2000 ONCE
Status: COMPLETED | OUTPATIENT
Start: 2021-08-22 | End: 2021-08-22

## 2021-08-22 RX ADMIN — ACETAMINOPHEN 1000 MG: 500 TABLET, FILM COATED ORAL at 20:06

## 2021-08-22 RX ADMIN — FLUCONAZOLE 400 MG: 2 INJECTION, SOLUTION INTRAVENOUS at 17:07

## 2021-08-22 RX ADMIN — OXYCODONE 10 MG: 5 TABLET ORAL at 13:57

## 2021-08-22 RX ADMIN — CEFTRIAXONE 2 G: 2 INJECTION, POWDER, FOR SOLUTION INTRAMUSCULAR; INTRAVENOUS at 08:48

## 2021-08-22 RX ADMIN — OXYCODONE 10 MG: 5 TABLET ORAL at 04:53

## 2021-08-22 RX ADMIN — FAMOTIDINE 20 MG: 10 INJECTION INTRAVENOUS at 21:19

## 2021-08-22 RX ADMIN — PIPERACILLIN AND TAZOBACTAM 4.5 G: 4; .5 INJECTION, POWDER, FOR SOLUTION INTRAVENOUS at 07:30

## 2021-08-22 RX ADMIN — FAMOTIDINE 20 MG: 10 INJECTION INTRAVENOUS at 07:30

## 2021-08-22 RX ADMIN — ACETAMINOPHEN 1000 MG: 500 TABLET, FILM COATED ORAL at 11:23

## 2021-08-22 RX ADMIN — PIPERACILLIN AND TAZOBACTAM 4.5 G: 4; .5 INJECTION, POWDER, FOR SOLUTION INTRAVENOUS at 13:40

## 2021-08-22 RX ADMIN — DEXTROSE MONOHYDRATE, SODIUM CHLORIDE, AND POTASSIUM CHLORIDE 150 ML/HR: 50; 4.5; 1.49 INJECTION, SOLUTION INTRAVENOUS at 13:41

## 2021-08-22 RX ADMIN — PIPERACILLIN AND TAZOBACTAM 4.5 G: 4; .5 INJECTION, POWDER, FOR SOLUTION INTRAVENOUS at 20:22

## 2021-08-22 RX ADMIN — DEXTROSE MONOHYDRATE, SODIUM CHLORIDE, AND POTASSIUM CHLORIDE 150 ML/HR: 50; 4.5; 1.49 INJECTION, SOLUTION INTRAVENOUS at 23:47

## 2021-08-22 RX ADMIN — ACETAMINOPHEN 1000 MG: 500 TABLET, FILM COATED ORAL at 02:36

## 2021-08-22 RX ADMIN — VANCOMYCIN HYDROCHLORIDE 1000 MG: 1 INJECTION, POWDER, LYOPHILIZED, FOR SOLUTION INTRAVENOUS at 21:20

## 2021-08-22 RX ADMIN — DEXTROSE MONOHYDRATE, SODIUM CHLORIDE, AND POTASSIUM CHLORIDE 150 ML/HR: 50; 4.5; 1.49 INJECTION, SOLUTION INTRAVENOUS at 04:34

## 2021-08-22 RX ADMIN — PIPERACILLIN AND TAZOBACTAM 4.5 G: 4; .5 INJECTION, POWDER, FOR SOLUTION INTRAVENOUS at 02:33

## 2021-08-22 RX ADMIN — VANCOMYCIN HYDROCHLORIDE 2000 MG: 10 INJECTION, POWDER, LYOPHILIZED, FOR SOLUTION INTRAVENOUS at 08:48

## 2021-08-22 NOTE — DISCHARGE SUMMARY
[address] [facility]  [phone]   Discharge Summary     Crystal Tesfaye  MRN: [id number]     : 1990     Age: 32 y.o. Admit date: 2021     Discharge date:  21  Attending Physician: Debbi Hollingsworth MD, MD, FACS  Primary Discharge Diagnosis:   Principal Problem:    Sepsis ([address] (2021)    Active Problems:    Acute appendicitis (2021)      Overview: 21 s/p lap appendectomy; Dr Meghan Mandel      RLQ abdominal pain (2021)      Fever (2021)      Right lower quadrant guarding (2021)      Leukocytosis (2021)      Tachycardia (2021)      Primary Operations or Procedures Performed :  Procedure(s):  LAPAROSCOPIC APPENDECTOMY     Brief History and Reason for Admission: Crystal Tesfaye was admitted with the following history of present illness. HPI: Crystal Tesfaye is a 32 y.o. male who is s/p lap appendectomy on 21.           Prior to surgery he came to the ER on 21 with a 3day h/o constant, severe, progressive RLQ abd pain. Nothing in particular made his pain better or worse. He had nausea but no vomiting. He had associated fever  He denied prior abdominal surgery. He had a markedly elevated white count and a CT scan shown below which identified findings consistent with acute appendicitis. He had signs of sepsis              21 CT abd/pelvis with IV contrast  There is a dilated, inflamed appendix that is fluid-filled with marked para appendiceal fat inflammation. The appendix measures 10 mm in transverse dimension. No periappendiceal abscess or free air present to strongly suspect perforation.     The liver, spleen, pancreas and kidneys are normal. There is no hydronephrosis. The gallbladder is normal. The adrenal glands are normal. The bowel is normal in course, caliber, and wall thickness.     The rectum is normal. The bladder is normal. No interval hernia or adenopathy. No free fluid accumulating dependently in the pelvis. o aggressive osseous lesions identified.     IMPRESSION  Acute appendicitis with marked periappendiceal inflammatory fat stranding in RLQ   without definite abscess or extraluminal air to indicate perforation.     [facility] Course: Additional hx:  8/21/21 POD1; Feels much better; still intermittent fever; WBC elevated but better; Cont IV Abx;  # [id number]  8/22/1 POD2 Feels well, Tolerating PO; no pain issues; drain serosang; Home today on PO Abx for 2 weeks         Condition at Discharge: good    Discharge Medications:   Current Discharge Medication List      START taking these medications    Details   amoxicillin-clavulanate (Augmentin) 875-125 mg per tablet Take 1 Tablet by mouth every twelve (12) hours for 14 days. Qty: 28 Tablet, Refills: 0      HYDROcodone-acetaminophen (Norco) 5-325 mg per tablet Take 1-2 Tablets by mouth every eight (8) hours for 7 days. Max Daily Amount: 6 Tablets. Qty: 28 Tablet, Refills: 0    Associated Diagnoses: Acute appendicitis with localized peritonitis, without perforation, abscess, or gangrene; Right lower quadrant guarding               Disposition/Discharge Instructions/Follow-up Care:       Dressings/Wound Care  Empty the drain as often as needed to keep it suctioning (compressed) at all times  Leave your incisional dressings alone for 5-7 days but then replace them daily with band-aids or gauze/tape  Try to keep incisions as dry as possible to lower risk of infection. Activity  No heavy lifting (>5lbs) for 6 weeks to reduce risk of developing a hernia in the incisions. No driving until you are off pain meds for 24hrs and have no pain with movements associated with driving.     Pain prescription (Norco) electronically sent to your pharmacy  Antibiotic prescription also sent to your pharmacy for use at home  Follow-up with Dr Parveen Evans nurse practitioner James Zapata NP or Delmi Chavez NP) in 13 days on a Thursday. Then see Dr Brendon Matta as needed after that visit.   [address], [address]  (Call for an appt time unless one is already made for you by discharge nurse which is preferred -->[phone]-->option 1)    Diet  Soups, Juice, and Liquids for 1 day   Then Soft diet until follow-up           Signed:  Sherren Brick, MD, FACS   8/22/2021  1:35 AM

## 2021-08-22 NOTE — PROGRESS NOTES
Dr. Alexey Sanderson called floor ordering one time dose of Vancomycin, Rocephin, and needs am dose of Zosyn before discharge. Also wants instructions given to pt to call Dr. Alexey Sanderson if fever persists once home. Day shift-0815 informed of these instructions. [id number] instruction via translation video service to call Dr. Alexey Sanderson if fever persists. Thais Lawrence RN informed.

## 2021-08-22 NOTE — PROGRESS NOTES
Pt provided with Good Rx coupons for his two discharge medications at a cost of about $35. at [facility]. Pt is for discharge home today with family and no needs/supportive care orders recieved for CM at this time.     Care Management Interventions  PCP Verified by CM:  (other physician)  Mode of Transport at Discharge:  (family)  Transition of Care Consult (CM Consult): Discharge Planning  Discharge Durable Medical Equipment: No  Physical Therapy Consult: No  Occupational Therapy Consult: No  Speech Therapy Consult: No  Current Support Network: Lives with Spouse, Own Home  Discharge Location  Discharge Placement: Home

## 2021-08-22 NOTE — PROGRESS NOTES
END OF SHIFT NOTE:    INTAKE/OUTPUT  08/21 0701 - 08/22 0700  In: 0870 [P.O.:360; I.V.:1330]  Out: 4022 [Urine:1175; Drains:30]  Voiding: YES  Catheter: NO  Drain:   Maddi Vogel #1 08/20/21 Anterior; Lower Abdomen (Active)   Site Assessment Clean, dry, & intact 08/22/21 0716   Dressing Status Clean, dry, & intact 08/22/21 0716   Drainage Description Serosanguinous 08/22/21 0716   Jermaine Drain Airleak No 08/22/21 0716   Status Patent; Charged;Draining 08/22/21 0716   Y Connector Used No 08/22/21 0716   Output (ml) 5 ml 08/22/21 0448               Flatus: Patient does have flatus present. Stool:  0 occurrences. Characteristics:  Stool Assessment  Stool Appearance: Formed    Emesis: 0 occurrences. Characteristics:        VITAL SIGNS  Patient Vitals for the past 12 hrs:   Temp Pulse Resp BP SpO2   08/22/21 1548 97.3 °F (36.3 °C) 76 18 113/72 98 %   08/22/21 1110 (!) 102.6 °F (39.2 °C) (!) 115 18 125/80 95 %       Pain Assessment  Pain Intensity 1: 6 (08/22/21 1359)  Pain Location 1: Abdomen  Pain Intervention(s) 1: Medication (see MAR)  Patient Stated Pain Goal: 0    Ambulating  Yes    Shift report given to oncoming nurse at the bedside.     Jovita Walker RN

## 2021-08-22 NOTE — PROGRESS NOTES
END OF SHIFT NOTE:    INTAKE/OUTPUT  08/21 0701 - 08/22 0700  In: 360 [P.O.:360]  Out: 1100 [ZVCUC:6554; Drains:25]  Voiding: YES  Catheter: NO  Drain:   Willadean Peek #1 08/20/21 Anterior; Lower Abdomen (Active)   Site Assessment Clean, dry, & intact 08/21/21 1822   Dressing Status Clean, dry, & intact 08/21/21 1822   Drainage Description Sanguinous 08/21/21 1822   Jermaine Drain Airleak No 08/21/21 1822   Status Patent; Charged;Draining 08/21/21 1822   Y Connector Used No 08/21/21 1415   Output (ml) 20 ml 08/21/21 1415               Flatus: Patient does have flatus present. Stool:  0 occurrences. Characteristics:  Stool Assessment  Stool Appearance: Formed    Emesis: 0 occurrences. Characteristics:        VITAL SIGNS  Patient Vitals for the past 12 hrs:   Temp Pulse Resp BP SpO2   08/22/21 0239 (!) 102.4 °F (39.1 °C) (!) 130 24 126/75 95 %   08/21/21 2321 99.7 °F (37.6 °C) 93 16 100/61 95 %   08/21/21 2021 (!) 102.9 °F (39.4 °C) (!) 118 18 127/79 98 %   08/21/21 1841 (!) 101.7 °F (38.7 °C) (!) 118 18 119/73 97 %       Pain Assessment  Pain Intensity 1: 4 (08/21/21 1637)  Pain Location 1: Abdomen  Pain Intervention(s) 1: Medication (see MAR)  Patient Stated Pain Goal: 0    Ambulating  No  Messaged surgery that temp continues to be 102.4 , tylenol given q6hrs , IVAB infusing. No new orders. Shift report given to oncoming nurse at the bedside.     Oamr Altamirano, BOB

## 2021-08-22 NOTE — PROGRESS NOTES
H&P/Consult Note/Progress Note/Office Note:   Myriam Murrieta  MRN: [id number]  :1990  Age:31 y.o.    HPI: Myriam Murrieta is a 32 y.o. male who is s/p lap appendectomy on 21. Prior to surgery he came to the ER on 21 with a 3day h/o constant, severe, progressive RLQ abd pain. Nothing in particular made his pain better or worse. He had nausea but no vomiting. He had associated fever  He denied prior abdominal surgery. He had a markedly elevated white count and a CT scan shown below which identified findings consistent with acute appendicitis. He had signs of sepsis          21 CT abd/pelvis with IV contrast  There is a dilated, inflamed appendix that is fluid-filled with marked para appendiceal fat inflammation. The appendix measures 10 mm in transverse dimension. No periappendiceal abscess or free air present to strongly suspect perforation.     The liver, spleen, pancreas and kidneys are normal. There is no hydronephrosis. The gallbladder is normal. The adrenal glands are normal. The bowel is normal in course, caliber, and wall thickness.     The rectum is normal. The bladder is normal. No interval hernia or adenopathy. No free fluid accumulating dependently in the pelvis. o aggressive osseous lesions identified.     IMPRESSION  Acute appendicitis with marked periappendiceal inflammatory fat stranding in RLQ   without definite abscess or extraluminal air to indicate perforation.          Additional hx:  21 POD1; Feels much better; still intermittent fever; WBC elevated but better; Cont IV Abx;  # [id number]  21 POD2; fevers on Zosyn; bld cultures with gm + cocci (possible enterococcus); Change to Vanc/Zosyn/Diflucan and consult ID                History reviewed. No pertinent past medical history. History reviewed. No pertinent surgical history.   Current Facility-Administered Medications   Medication Dose Route Frequency    vancomycin (VANCOCIN) 1,000 mg in 0.9% sodium chloride 250 mL (VIAL-MATE)  1,000 mg IntraVENous Q12H    fluconazole (DIFLUCAN) 400mg/200 mL IVPB (premix)  400 mg IntraVENous Q24H    ondansetron (ZOFRAN) injection 4 mg  4 mg IntraVENous Q4H PRN    morphine injection 2-6 mg  2-6 mg IntraVENous Q1H PRN    acetaminophen (TYLENOL) tablet 1,000 mg  1,000 mg Oral Q6H PRN    oxyCODONE IR (ROXICODONE) tablet 5-10 mg  5-10 mg Oral Q4H PRN    dextrose 5% - 0.45% NaCl with KCl 20 mEq/L infusion  150 mL/hr IntraVENous CONTINUOUS    piperacillin-tazobactam (ZOSYN) 4.5 g in 0.9% sodium chloride (MBP/ADV) 100 mL MBP  4.5 g IntraVENous Q6H    famotidine (PF) (PEPCID) 20 mg in 0.9% sodium chloride 10 mL injection  20 mg IntraVENous Q12H     Patient has no known allergies. Social History     Socioeconomic History    Marital status:      Spouse name: Not on file    Number of children: Not on file    Years of education: Not on file    Highest education level: Not on file     Social Determinants of Health     Financial Resource Strain:     Difficulty of Paying Living Expenses:    Food Insecurity:     Worried About Running Out of Food in the Last Year:     [address] in the Last Year:    Transportation Needs:     Lack of Transportation (Medical):  Lack of Transportation (Non-Medical):    Physical Activity:     Days of Exercise per Week:     Minutes of Exercise per Session:    Stress:     Feeling of Stress :    Social Connections:     Frequency of Communication with Friends and Family:     Frequency of Social Gatherings with Friends and Family:     Attends Spiritism Services:     Active Member of Clubs or Organizations:     Attends Club or Organization Meetings:     Marital Status:      Social History     Tobacco Use   Smoking Status Not on file     No family history on file. ROS: The patient has no difficulty with chest pain or shortness of breath. No fever or chills.   Comprehensive review of systems was otherwise unremarkable except as noted above. Physical Exam:   Visit Vitals  /80   Pulse (!) 115   Temp (!) 102.6 °F (39.2 °C)   Resp 18   Ht 5' 4\" (1.626 m)   Wt 170 lb (77.1 kg)   SpO2 95%   BMI 29.18 kg/m²     Vitals:    08/22/21 0239 08/22/21 0500 08/22/21 0534 08/22/21 1110   BP: 126/75  102/61 125/80   Pulse: (!) 130  91 (!) 115   Resp: 24  18 18   Temp: (!) 102.4 °F (39.1 °C) 99.3 °F (37.4 °C) 98.7 °F (37.1 °C) (!) 102.6 °F (39.2 °C)   SpO2: 95%  94% 95%   Weight:       Height:         08/22 0701 - 08/22 1900  In: -   Out: 700 [Urine:700]  08/20 1901 - 08/22 0700  In: 2510 [P.O.:480; I.V.:2030]  Out: 1415 [Urine:1375; Drains:35]    Constitutional: Alert, oriented, cooperative patient in no acute distress; appears stated age    Eyes:Sclera are clear. EOMs intact  ENMT: no external lesions gross hearing normal; no obvious neck masses, no ear or lip lesions, nares normal  CV: RRR. Normal perfusion  Resp: No JVD. Breathing is  non-labored; no audible wheezing. GI: non-distended; Incisions OK; Jermaine drain serosang     Musculoskeletal: unremarkable with normal function. No embolic signs or cyanosis.    Neuro:  Oriented; moves all 4; no focal deficits  Psychiatric: normal affect and mood, no memory impairment    Recent vitals (if inpt):  Patient Vitals for the past 24 hrs:   BP Temp Pulse Resp SpO2   08/22/21 1110 125/80 (!) 102.6 °F (39.2 °C) (!) 115 18 95 %   08/22/21 0534 102/61 98.7 °F (37.1 °C) 91 18 94 %   08/22/21 0500  99.3 °F (37.4 °C)      08/22/21 0239 126/75 (!) 102.4 °F (39.1 °C) (!) 130 24 95 %   08/21/21 2321 100/61 99.7 °F (37.6 °C) 93 16 95 %   08/21/21 2021 127/79 (!) 102.9 °F (39.4 °C) (!) 118 18 98 %   08/21/21 1841 119/73 (!) 101.7 °F (38.7 °C) (!) 118 18 97 %       Amount and/or Complexity of Data Reviewed and Analyzed:  I reviewed and analyzed all of the unique labs and radiologic studies that are shown below as well as any that are in the HPI, and any that are in the expanded problem list below  *Each unique test, order, or document contributes to the combination of 2 or combination of 3 in Category 1 below. For this visit I also reviewed old records and prior notes. Recent Labs     08/22/21  0430 08/21/21  1211 08/20/21  1623 08/20/21  1617   WBC 6.9   < > 19.8*  --    HGB 12.6*   < > 16.0  --    *   < > 233  --    *   < > 138  --    K 3.1*   < > 3.5  --       < > 105  --    CO2 26   < > 28  --    BUN 7   < > 10  --    CREA 0.95   < > 1.06  --    *   < > 112*  --    TBILI  --   --  1.5*  --    ALT  --   --  36  --    AP  --   --  82  --    LPSE  --   --   --  93    < > = values in this interval not displayed. Review of most recent CBC  Lab Results   Component Value Date/Time    WBC 6.9 08/22/2021 04:30 AM    HGB 12.6 (L) 08/22/2021 04:30 AM    HCT 38.3 (L) 08/22/2021 04:30 AM    PLATELET 738 (L) 04/64/8075 04:30 AM    MCV 87.0 08/22/2021 04:30 AM       Review of most recent BMP  Lab Results   Component Value Date/Time    Sodium 137 (L) 08/22/2021 04:30 AM    Potassium 3.1 (L) 08/22/2021 04:30 AM    Chloride 105 08/22/2021 04:30 AM    CO2 26 08/22/2021 04:30 AM    Anion gap 6 (L) 08/22/2021 04:30 AM    Glucose 135 (H) 08/22/2021 04:30 AM    BUN 7 08/22/2021 04:30 AM    Creatinine 0.95 08/22/2021 04:30 AM    GFR est AA >60 08/22/2021 04:30 AM    GFR est non-AA >60 08/22/2021 04:30 AM    Calcium 7.6 (L) 08/22/2021 04:30 AM       Review of most recent LFTs (and lipase if done)  Lab Results   Component Value Date/Time    ALT (SGPT) 36 08/20/2021 04:23 PM    AST (SGOT) 16 08/20/2021 04:23 PM    Alk.  phosphatase 82 08/20/2021 04:23 PM    Bilirubin, total 1.5 (H) 08/20/2021 04:23 PM     Lab Results   Component Value Date/Time    Lipase 93 08/20/2021 04:17 PM       No results found for: INR, APTT, CBIL, LCAD, NH4, TROPT, TROIQ, INREXT, INREXT    Review of most recent HgbA1c  No results found for: HBA1C, CHV7CUXW, EMP9QIFI, QKU2BWHU    Nutritional assessment screen for wound healing issues:  Lab Results   Component Value Date/Time    Protein, total 8.8 (H) 08/20/2021 04:23 PM    Albumin 3.3 (L) 08/20/2021 04:23 PM       @lastcovr@  XR Results (most recent):  Results from Hospital Encounter encounter on 08/20/21    XR CHEST PORT    Narrative  Single view chest x-ray    Clinical indication: Epigastric abdominal pain, sepsis    FINDINGS: Single view of the chest show the lungs to be expanded and clear. No  pleural effusion or pneumothorax. The cardiac silhouette and mediastinum are  unremarkable. The bones are normal.    Impression  Normal single view chest x-ray. CT Results (most recent):  Results from Hospital Encounter encounter on 08/20/21    CT ABD PELV W CONT    Narrative  CT OF THE ABDOMEN AND PELVIS WITH CONTRAST. CLINICAL INDICATION: Right lower quadrant abdominal pain for two days, fever,  elevated white blood cell count    PROCEDURE: Serial thin section axial images obtained from the lung bases through  the proximal femurs following the administration of 100 cc of Isovue 370  intravenous contrast.  Radiation dose reduction techniques were used for this  study. Our CT scanners use one or all of the following: Automated exposure  control, adjusted of the mA and/or kV according to patient size, iterative  reconstruction    COMPARISON: No prior    FINDINGS:    CT ABDOMEN: There is a dilated, inflamed appendix that is fluid-filled with  marked para appendiceal fat inflammation. The appendix measures 10 mm in  transverse dimension. No periappendiceal abscess or free air present to strongly  suspect perforation. The liver, spleen, pancreas and kidneys are normal. There is no hydronephrosis. The gallbladder is normal. The adrenal glands are normal. The bowel is normal in  course, caliber, and wall thickness. CT PELVIS: The rectum is normal. The bladder is normal. No interval hernia or  adenopathy. No free fluid accumulating dependently in the pelvis.     No aggressive osseous lesions identified. Impression  Acute appendicitis with marked periappendiceal inflammatory fat  stranding in the right lower abdominal quadrant without definite abscess or  extraluminal air to indicate perforation. These important findings were called to the emergency room physician Dr. Stella Nyhan  by Dr. Con Murillo at 6:50 PM on 8/20/2021    US Results (most recent):  No results found for this or any previous visit. Admission date (for inpatients): 8/20/2021   * No surgery date entered *  Procedure(s):  LAPAROSCOPIC APPENDECTOMY        ASSESSMENT/PLAN:  Problem List  Date Reviewed: 8/20/2021        Codes Class Noted    Acute appendicitis ICD-10-CM: K35.80  ICD-9-CM: 540.9  8/20/2021    Overview Signed 8/20/2021  8:15 PM by Dru Barbosa MD     8/20/21 s/p lap appendectomy; Dr Heidi Valiente abdominal pain ICD-10-CM: R10.31  ICD-9-CM: 789.03  8/20/2021        Fever ICD-10-CM: R50.9  ICD-9-CM: 780.60  8/20/2021        Right lower quadrant guarding ICD-10-CM: R19.8  ICD-9-CM: 789.9  8/20/2021        Leukocytosis ICD-10-CM: D72.829  ICD-9-CM: 288.60  8/20/2021        Tachycardia ICD-10-CM: R00.0  ICD-9-CM: 785.0  8/20/2021        * (Principal) Sepsis ([facility] [address] ICD-10-CM: A41.9  ICD-9-CM: 038.9, 995.91  8/20/2021            Principal Problem:    Sepsis ([address] (8/20/2021)    Active Problems:    Acute appendicitis (8/20/2021)      Overview: 8/20/21 s/p lap appendectomy; Dr Lashawn PRETTYQ abdominal pain (8/20/2021)      Fever (8/20/2021)      Right lower quadrant guarding (8/20/2021)      Leukocytosis (8/20/2021)      Tachycardia (8/20/2021)           Number and Complexity of Problems addressed and   Risks of complications and/or morbidity of management        Sepsis with Fever, leukocytosis, RLQ pain, +guarding, CT evidence for acute appendicitis  He is s/p lap appendectomy on 8/20/21.   He had sepsis    Continue inpt admission until fevers resolve  Zosyn--->Vanc/Zosyn/diflucan  Bld cultures with gm + cocci so far  Consult ID for help    Full liquid diet    Daily labs  Drain teaching done and post-op instructions done with pt using with  # [id number]    IV Pepcid  SCDs    Home when fevers resolve            I have personally performed a face-to-face diagnostic evaluation and management  service on this patient. I have independently seen the patient. I have independently obtained the above history from the patient/family. I have independently examined the patient with above findings. I have independently reviewed data/labs for this patient and developed the above plan of care (MDM). Signed: Perez Aguirre.  Judith Martins MD, FACS

## 2021-08-22 NOTE — PROGRESS NOTES
Problem: Pain  Goal: *Control of Pain  Outcome: Progressing Towards Goal  Goal: *PALLIATIVE CARE:  Alleviation of Pain  Outcome: Progressing Towards Goal     Problem: Falls - Risk of  Goal: *Absence of Falls  Description: Document Axel Fall Risk and appropriate interventions in the flowsheet.   Outcome: Progressing Towards Goal  Note: Fall Risk Interventions:            Medication Interventions: Evaluate medications/consider consulting pharmacy, Patient to call before getting OOB, Teach patient to arise slowly

## 2021-08-23 LAB
ANION GAP SERPL CALC-SCNC: 4 MMOL/L (ref 7–16)
BUN SERPL-MCNC: 4 MG/DL (ref 6–23)
CALCIUM SERPL-MCNC: 8.2 MG/DL (ref 8.3–10.4)
CHLORIDE SERPL-SCNC: 107 MMOL/L (ref 98–107)
CO2 SERPL-SCNC: 27 MMOL/L (ref 21–32)
COVID-19 RAPID TEST, COVR: NOT DETECTED
CREAT SERPL-MCNC: 0.78 MG/DL (ref 0.8–1.5)
ERYTHROCYTE [DISTWIDTH] IN BLOOD BY AUTOMATED COUNT: 13.2 % (ref 11.9–14.6)
GLUCOSE SERPL-MCNC: 108 MG/DL (ref 65–100)
HCT VFR BLD AUTO: 40.2 % (ref 41.1–50.3)
HGB BLD-MCNC: 13.3 G/DL (ref 13.6–17.2)
MCH RBC QN AUTO: 28.9 PG (ref 26.1–32.9)
MCHC RBC AUTO-ENTMCNC: 33.1 G/DL (ref 31.4–35)
MCV RBC AUTO: 87.2 FL (ref 79.6–97.8)
NRBC # BLD: 0 K/UL (ref 0–0.2)
PLATELET # BLD AUTO: 150 K/UL (ref 150–450)
PMV BLD AUTO: 11.4 FL (ref 9.4–12.3)
POTASSIUM SERPL-SCNC: 3.2 MMOL/L (ref 3.5–5.1)
RBC # BLD AUTO: 4.61 M/UL (ref 4.23–5.6)
SARS-COV-2, COV2: NORMAL
SODIUM SERPL-SCNC: 138 MMOL/L (ref 138–145)
SOURCE, COVRS: NORMAL
VANCOMYCIN TROUGH SERPL-MCNC: 4.5 UG/ML (ref 5–20)
WBC # BLD AUTO: 4.7 K/UL (ref 4.3–11.1)

## 2021-08-23 PROCEDURE — 99218 HC RM OBSERVATION: CPT

## 2021-08-23 PROCEDURE — 74011250637 HC RX REV CODE- 250/637: Performed by: NURSE PRACTITIONER

## 2021-08-23 PROCEDURE — 87635 SARS-COV-2 COVID-19 AMP PRB: CPT

## 2021-08-23 PROCEDURE — U0005 INFEC AGEN DETEC AMPLI PROBE: HCPCS

## 2021-08-23 PROCEDURE — 87086 URINE CULTURE/COLONY COUNT: CPT

## 2021-08-23 PROCEDURE — 74011250636 HC RX REV CODE- 250/636: Performed by: NURSE PRACTITIONER

## 2021-08-23 PROCEDURE — 74011000258 HC RX REV CODE- 258: Performed by: SURGERY

## 2021-08-23 PROCEDURE — 36415 COLL VENOUS BLD VENIPUNCTURE: CPT

## 2021-08-23 PROCEDURE — 96376 TX/PRO/DX INJ SAME DRUG ADON: CPT

## 2021-08-23 PROCEDURE — 74011250637 HC RX REV CODE- 250/637: Performed by: SURGERY

## 2021-08-23 PROCEDURE — 80202 ASSAY OF VANCOMYCIN: CPT

## 2021-08-23 PROCEDURE — 85027 COMPLETE CBC AUTOMATED: CPT

## 2021-08-23 PROCEDURE — 80048 BASIC METABOLIC PNL TOTAL CA: CPT

## 2021-08-23 PROCEDURE — 65270000029 HC RM PRIVATE

## 2021-08-23 PROCEDURE — 74011250636 HC RX REV CODE- 250/636: Performed by: SURGERY

## 2021-08-23 RX ORDER — MORPHINE SULFATE 2 MG/ML
2-5 INJECTION, SOLUTION INTRAMUSCULAR; INTRAVENOUS
Status: DISCONTINUED | OUTPATIENT
Start: 2021-08-23 | End: 2021-08-25 | Stop reason: HOSPADM

## 2021-08-23 RX ORDER — POTASSIUM CHLORIDE 14.9 MG/ML
20 INJECTION INTRAVENOUS ONCE
Status: COMPLETED | OUTPATIENT
Start: 2021-08-23 | End: 2021-08-23

## 2021-08-23 RX ORDER — FAMOTIDINE 20 MG/1
20 TABLET, FILM COATED ORAL 2 TIMES DAILY
Status: DISCONTINUED | OUTPATIENT
Start: 2021-08-23 | End: 2021-08-25 | Stop reason: HOSPADM

## 2021-08-23 RX ADMIN — PIPERACILLIN AND TAZOBACTAM 4.5 G: 4; .5 INJECTION, POWDER, FOR SOLUTION INTRAVENOUS at 09:31

## 2021-08-23 RX ADMIN — MORPHINE SULFATE 2 MG: 2 INJECTION, SOLUTION INTRAMUSCULAR; INTRAVENOUS at 21:18

## 2021-08-23 RX ADMIN — VANCOMYCIN HYDROCHLORIDE 1000 MG: 1 INJECTION, POWDER, LYOPHILIZED, FOR SOLUTION INTRAVENOUS at 21:00

## 2021-08-23 RX ADMIN — OXYCODONE 5 MG: 5 TABLET ORAL at 04:51

## 2021-08-23 RX ADMIN — DEXTROSE MONOHYDRATE, SODIUM CHLORIDE, AND POTASSIUM CHLORIDE 150 ML/HR: 50; 4.5; 1.49 INJECTION, SOLUTION INTRAVENOUS at 06:54

## 2021-08-23 RX ADMIN — VANCOMYCIN HYDROCHLORIDE 1000 MG: 1 INJECTION, POWDER, LYOPHILIZED, FOR SOLUTION INTRAVENOUS at 09:31

## 2021-08-23 RX ADMIN — FAMOTIDINE 20 MG: 20 TABLET, FILM COATED ORAL at 17:48

## 2021-08-23 RX ADMIN — PIPERACILLIN AND TAZOBACTAM 4.5 G: 4; .5 INJECTION, POWDER, FOR SOLUTION INTRAVENOUS at 02:41

## 2021-08-23 RX ADMIN — FAMOTIDINE 20 MG: 20 TABLET, FILM COATED ORAL at 09:31

## 2021-08-23 RX ADMIN — OXYCODONE 5 MG: 5 TABLET ORAL at 13:52

## 2021-08-23 RX ADMIN — POTASSIUM CHLORIDE 20 MEQ: 14.9 INJECTION, SOLUTION INTRAVENOUS at 17:49

## 2021-08-23 RX ADMIN — PIPERACILLIN AND TAZOBACTAM 4.5 G: 4; .5 INJECTION, POWDER, FOR SOLUTION INTRAVENOUS at 13:42

## 2021-08-23 RX ADMIN — VANCOMYCIN HYDROCHLORIDE 1000 MG: 1 INJECTION, POWDER, LYOPHILIZED, FOR SOLUTION INTRAVENOUS at 21:19

## 2021-08-23 NOTE — PROGRESS NOTES
END OF SHIFT NOTE:    INTAKE/OUTPUT  08/22 0701 - 08/23 0700  In: 3882 [P.O.:480; I.V.:4216]  Out: 4901 [Urine:4900; Drains:1]  Voiding: YES  Catheter: NO  Drain:   Lambert Reges #1 08/20/21 Anterior; Lower Abdomen (Active)   Site Assessment Clean, dry, & intact 08/23/21 1342   Dressing Status Clean, dry, & intact 08/23/21 1342   Drainage Description Serosanguinous 08/23/21 1342   Jermaine Drain Airleak No 08/23/21 1342   Status Patent; Charged;Draining 08/23/21 1342   Y Connector Used No 08/23/21 1342   Output (ml) 10 ml 08/23/21 1342               Flatus: Patient does have flatus present. Stool:  0 occurrences. Characteristics:  Stool Assessment  Stool Appearance: Formed    Emesis: 0 occurrences. Characteristics:        VITAL SIGNS  Patient Vitals for the past 12 hrs:   Temp Pulse Resp BP SpO2   08/23/21 1557 98.6 °F (37 °C) 98 20 (!) 109/56 98 %   08/23/21 1125 99.6 °F (37.6 °C) 92 18 115/79 98 %       Pain Assessment  Pain Intensity 1: 0 (08/23/21 1430)  Pain Location 1: Abdomen  Pain Intervention(s) 1: Medication (see MAR)  Patient Stated Pain Goal: 0    Ambulating  Yes    Shift report given to oncoming nurse at the bedside.     Audelia Bosworth, RN

## 2021-08-23 NOTE — PROGRESS NOTES
END OF SHIFT NOTE:    INTAKE/OUTPUT  08/22 0701 - 08/23 0700  In: 3106 [P.O.:480; I.V.:3766]  Out: 4500 [Urine:4500]  Voiding: YES  Catheter: NO  Drain:   Linnette Darden #1 08/20/21 Anterior; Lower Abdomen (Active)   Site Assessment Clean, dry, & intact 08/22/21 0716   Dressing Status Clean, dry, & intact 08/22/21 0716   Drainage Description Serosanguinous 08/22/21 0716   Jermaine Drain Airleak No 08/22/21 0716   Status Patent; Charged;Draining 08/22/21 0716   Y Connector Used No 08/22/21 0716   Output (ml) 5 ml 08/22/21 0448               Flatus: Patient does have flatus present. Stool:  0 occurrences. Characteristics:  Stool Assessment  Stool Appearance: Formed    Emesis: 0 occurrences. Characteristics:        VITAL SIGNS  Patient Vitals for the past 12 hrs:   Temp Pulse Resp BP SpO2   08/23/21 0242 99 °F (37.2 °C) 80 16 121/87 98 %   08/22/21 2349 98.7 °F (37.1 °C) 72 16 119/77 97 %   08/22/21 1936 (!) 102.1 °F (38.9 °C) 91 16 117/74 98 %       Pain Assessment  Pain Intensity 1: 0 (08/23/21 0551)  Pain Location 1: Abdomen  Pain Intervention(s) 1: Medication (see MAR)  Patient Stated Pain Goal: 0    Ambulating  Yes  Having gas pains; took 1 oxycodone 5mg  Po this shfit and only 1 dose of Tylenol at 8pm for temp 102. Shift report given to oncoming nurse at the bedside.     Cristina Lama RN

## 2021-08-23 NOTE — CONSULTS
Infectious Disease Consult    Today's Date: 8/23/2021   Admit Date: 8/20/2021    Impression:   · Persistent fevers  · Anaerobic GPC bacteremia 8/20, source GI  · Acute appendicitis s/p lap appendectomy 8/20. No perforation found. Plan:   ·  Stop zosyn and fluconazole. Continue Vancomycin. Suspect this could be drug fever but will repeat BCs. · Follow temp curve   · Screen for HIV/Hepatitis     Anti-infectives:   · Vanc 8/22-  · CTX 8/22  · Zosyn 8/20-    Subjective:   Date of Consultation:  August 23, 2021  Referring Physician: Paddy Davenport     Patient is a 32 y.o. male with no medical hx who was admitted on 8/20 for acute appendicitis. CT was done to diagnose and this did not reveal perforation. Dr Paddy Davenport performed a lap appendectomy on 8/20. Intra-OP findings with reversible ischemia to cecum, no perforation. Admitting BCs  With 1/4 bottle + GPC anaerobic. PCR with strept species. Pt was placed on Zosyn but continued to spike regular fevers. WBC is normalized. On exam, pt is healing well, no specific complaints.  was used. Rapid COVID negative. Patient Active Problem List   Diagnosis Code    Acute appendicitis K35.80    RLQ abdominal pain R10.31    Fever R50.9    Right lower quadrant guarding R19.8    Leukocytosis D72.829    Tachycardia R00.0    Sepsis ([address] A41.9     History reviewed. No pertinent past medical history. No family history on file. Social History     Tobacco Use    Smoking status: Not on file   Substance Use Topics    Alcohol use: Not on file     History reviewed. No pertinent surgical history. Prior to Admission medications    Medication Sig Start Date End Date Taking? Authorizing Provider   amoxicillin-clavulanate (Augmentin) 875-125 mg per tablet Take 1 Tablet by mouth every twelve (12) hours for 14 days. 8/22/21 9/5/21 Yes Branden Herzog MD   HYDROcodone-acetaminophen (Norco) 5-325 mg per tablet Take 1-2 Tablets by mouth every eight (8) hours for 7 days. Max Daily Amount: 6 Tablets. 21 Yes Dru Barbosa MD       No Known Allergies     Review of Systems:  A comprehensive review of systems was negative except for that written in the History of Present Illness. Objective:     Visit Vitals  /79   Pulse 92   Temp 99.6 °F (37.6 °C)   Resp 18   Ht 5' 4\" (1.626 m)   Wt 77.1 kg (170 lb)   SpO2 98%   BMI 29.18 kg/m²     Temp (24hrs), Av.4 °F (37.4 °C), Min:97.3 °F (36.3 °C), Max:102.1 °F (38.9 °C)       Lines:  Peripheral IV:       Physical Exam:    General:  Alert, cooperative, well noursished, well developed, appears stated age   Eyes:  Sclera anicteric. Pupils equally round and reactive to light. Mouth/Throat: Mucous membranes normal, oral pharynx clear   Neck: Supple   Lungs:   Clear to auscultation bilaterally, good effort   CV:  Regular rate and rhythm,no murmur, click, rub or gallop   Abdomen:   Soft, non-tender. bowel sounds normal. non-distended. No guarding, ANA drain in place    Extremities: No cyanosis or edema   Skin: Skin color, texture, turgor normal. no acute rash or lesions   Lymph nodes: Cervical and supraclavicular normal   Musculoskeletal: No swelling or deformity   Lines/Devices:  Intact, no erythema, drainage or tenderness   Psych: Alert and oriented, normal mood affect given the setting       Data Review:     CBC:  Recent Labs     21  0425 21  0430 21  1211 21  1623 21  1623   WBC 4.7 6.9 12.8*   < > 19.8*   GRANS  --   --   --   --  93*   MONOS  --   --   --   --  4   EOS  --   --   --   --  0*   ANEU  --   --   --   --  18.4*   ABL  --   --   --   --  0.4*   HGB 13.3* 12.6* 13.1*   < > 16.0   HCT 40.2* 38.3* 39.2*   < > 46.7    146* 160   < > 233    < > = values in this interval not displayed.        BMP:  Recent Labs     21  0425 21  0430 21  1211   CREA 0.78* 0.95 0.96   BUN 4* 7 9    137* 139   K 3.2* 3.1* 3.2*    105 105   CO2 27 26 29   AGAP 4* 6* 5* * 135* 114*       LFTS:  Recent Labs     08/20/21  1623   TBILI 1.5*   ALT 36   AP 82   TP 8.8*   ALB 3.3*       Microbiology:     All Micro Results     Procedure Component Value Units Date/Time    BLOOD CULTURE [[id number]]  (Abnormal) Collected: 08/20/21 1623    Order Status: Completed Specimen: Blood Updated: 08/23/21 1132     Special Requests: --        NO SPECIAL REQUESTS  LEFT  Antecubital       GRAM STAIN GRAM POSITIVE COCCI         ANAEROBIC BOTTLE POSITIVE               RESULTS VERIFIED, PHONED TO AND READ BACK BY Terrence Hutchinson RN ON 8/21/22 @1610, [facility]           Culture result:       ANAEROBIC GRAM POSITIVE COCCI SENT TO [facility] FOR TESTING 8/23/21                  Refer to Blood Culture ID Panel Accession  [id number]      CULTURE, URINE [[id number]] Collected: 08/23/21 1056    Order Status: Completed Specimen: Urine from Clean catch Updated: 08/23/21 1119    COVID-19 RAPID TEST [[id number]] Collected: 08/23/21 0935    Order Status: Completed Specimen: Nasopharyngeal Updated: 08/23/21 1015     Specimen source Nasopharyngeal        COVID-19 rapid test Not detected        Comment:      The specimen is NEGATIVE for SARS-CoV-2, the novel coronavirus associated with COVID-19. A negative result does not rule out COVID-19. This test has been authorized by the FDA under an Emergency Use Authorization (EUA) for use by authorized laboratories.         Fact sheet for Healthcare Providers: ConventionUpdate.co.nz  Fact sheet for Patients: ConventionUpdate.co.nz       Methodology: Isothermal Nucleic Acid Amplification         BLOOD CULTURE [[id number]] Collected: 08/20/21 2000    Order Status: Completed Specimen: Blood Updated: 08/23/21 0648     Special Requests: --        LEFT  HAND       Culture result: NO GROWTH 3 DAYS       BLOOD CULTURE ID PANEL [[id number]]  (Abnormal) Collected: 08/20/21 1623    Order Status: Completed Specimen: Blood Updated: 08/22/21 5927 Acc. no. from [facility] [id number]     Streptococcus Detected        Comment: RESULTS VERIFIED, PHONED TO AND READ BACK BY  TARA Ruelas RN BY AALIYAH AT [phone]          INTERPRETATION       Gram positive cocci. Identified by realtime PCR as Streptococcus species (not agalactiae, pyogenes, or pneumoniae).            Comment: Consider discontinuation of IV vancomycin and using an anti-streptococcal beta-lactam (ceftriaxone/cefotaxime (peds))             Imaging:   See HPI    Signed By: Sai Viveros NP     August 23, 2021

## 2021-08-23 NOTE — PROGRESS NOTES
H&P/Consult Note/Progress Note/Office Note:   Eric Almanzar  MRN: [id number]  :1990  Age:31 y.o.    HPI: Eric Almanzar is a 32 y.o. male who is s/p lap appendectomy on 21. Prior to surgery he came to the ER on 21 with a 3day h/o constant, severe, progressive RLQ abd pain. Nothing in particular made his pain better or worse. He had nausea but no vomiting. He had associated fever  He denied prior abdominal surgery. He had a markedly elevated WBC and a CT scan shown below which identified findings consistent with acute appendicitis. He had signs of sepsis. 21 CXR  Shows lungs to be expanded and clear. No pleural effusion or pneumothorax. The cardiac silhouette and mediastinum are unremarkable. The bones are normal.     IMPRESSION:  Normal single view chest x-ray. 21 CT abd/pelvis with IV contrast  There is a dilated, inflamed appendix that is fluid-filled with marked para appendiceal fat inflammation. The appendix measures 10 mm in transverse dimension. No periappendiceal abscess or free air present to strongly suspect perforation.     The liver, spleen, pancreas and kidneys are normal. There is no hydronephrosis. The gallbladder is normal. The adrenal glands are normal. The bowel is normal in course, caliber, and wall thickness.     The rectum is normal. The bladder is normal. No interval hernia or adenopathy. No free fluid accumulating dependently in the pelvis. o aggressive osseous lesions identified.     IMPRESSION  Acute appendicitis with marked periappendiceal inflammatory fat stranding in RLQ   without definite abscess or extraluminal air to indicate perforation.          Additional hx:  21 POD1; Feels much better; still intermittent fever; WBC elevated but better; Cont IV Abx;  # [id number]  21 POD2; fevers on Zosyn; bld cultures with gm + cocci (possible enterococcus);   Change to Vanc/Zosyn/Diflucan and consult ID  21 POD3; fevers on Vanc/Zosyn/Diflucan started yesterday; COVID test pending; ID consulted              History reviewed. No pertinent past medical history. History reviewed. No pertinent surgical history. Current Facility-Administered Medications   Medication Dose Route Frequency    famotidine (PEPCID) tablet 20 mg  20 mg Oral BID    morphine injection 2-5 mg  2-5 mg IntraVENous Q1H PRN    vancomycin (VANCOCIN) 1,000 mg in 0.9% sodium chloride 250 mL (VIAL-MATE)  1,000 mg IntraVENous Q12H    fluconazole (DIFLUCAN) 400mg/200 mL IVPB (premix)  400 mg IntraVENous Q24H    ondansetron (ZOFRAN) injection 4 mg  4 mg IntraVENous Q4H PRN    acetaminophen (TYLENOL) tablet 1,000 mg  1,000 mg Oral Q6H PRN    oxyCODONE IR (ROXICODONE) tablet 5-10 mg  5-10 mg Oral Q4H PRN    dextrose 5% - 0.45% NaCl with KCl 20 mEq/L infusion  30 mL/hr IntraVENous CONTINUOUS    piperacillin-tazobactam (ZOSYN) 4.5 g in 0.9% sodium chloride (MBP/ADV) 100 mL MBP  4.5 g IntraVENous Q6H     Patient has no known allergies. Social History     Socioeconomic History    Marital status:      Spouse name: Not on file    Number of children: Not on file    Years of education: Not on file    Highest education level: Not on file     Social Determinants of Health     Financial Resource Strain:     Difficulty of Paying Living Expenses:    Food Insecurity:     Worried About Running Out of Food in the Last Year:     [address] in the Last Year:    Transportation Needs:     Lack of Transportation (Medical):      Lack of Transportation (Non-Medical):    Physical Activity:     Days of Exercise per Week:     Minutes of Exercise per Session:    Stress:     Feeling of Stress :    Social Connections:     Frequency of Communication with Friends and Family:     Frequency of Social Gatherings with Friends and Family:     Attends Mu-ism Services:     Active Member of Clubs or Organizations:     Attends Club or Organization Meetings:    [employer] Marital Status:      Social History     Tobacco Use   Smoking Status Not on file     No family history on file. ROS: The patient has no difficulty with chest pain or shortness of breath. No fever or chills. Comprehensive review of systems was otherwise unremarkable except as noted above. Physical Exam:   Visit Vitals  /83   Pulse 74   Temp 99.4 °F (37.4 °C)   Resp 18   Ht 5' 4\" (1.626 m)   Wt 170 lb (77.1 kg)   SpO2 97%   BMI 29.18 kg/m²     Vitals:    08/22/21 1936 08/22/21 2349 08/23/21 0242 08/23/21 0729   BP: 117/74 119/77 121/87 130/83   Pulse: 91 72 80 74   Resp: 16 16 16 18   Temp: (!) 102.1 °F (38.9 °C) 98.7 °F (37.1 °C) 99 °F (37.2 °C) 99.4 °F (37.4 °C)   SpO2: 98% 97% 98% 97%   Weight:       Height:         08/23 0701 - 08/23 1900  In: -   Out: 400 [Urine:400]  08/21 1901 - 08/23 0700  In: 5706 [P.O.:840; I.V.:5546]  Out: 5106 [Urine:5100; Drains:6]    Constitutional: Alert, oriented, cooperative patient in no acute distress; appears stated age    Eyes:Sclera are clear. EOMs intact  ENMT: no external lesions gross hearing normal; no obvious neck masses, no ear or lip lesions, nares normal  CV: RRR. Normal perfusion  Resp: No JVD. Breathing is  non-labored; no audible wheezing. GI: non-distended; non-tender; Incisions OK; Jermaine drain serosang     Musculoskeletal: unremarkable with normal function. No embolic signs or cyanosis.    Neuro:  Oriented; moves all 4; no focal deficits  Psychiatric: normal affect and mood, no memory impairment    Recent vitals (if inpt):  Patient Vitals for the past 24 hrs:   BP Temp Pulse Resp SpO2   08/23/21 0729 130/83 99.4 °F (37.4 °C) 74 18 97 %   08/23/21 0242 121/87 99 °F (37.2 °C) 80 16 98 %   08/22/21 2349 119/77 98.7 °F (37.1 °C) 72 16 97 %   08/22/21 1936 117/74 (!) 102.1 °F (38.9 °C) 91 16 98 %   08/22/21 1548 113/72 97.3 °F (36.3 °C) 76 18 98 %   08/22/21 1110 125/80 (!) 102.6 °F (39.2 °C) (!) 115 18 95 %       Amount and/or Complexity of Data Reviewed and Analyzed:  I reviewed and analyzed all of the unique labs and radiologic studies that are shown below as well as any that are in the HPI, and any that are in the expanded problem list below  *Each unique test, order, or document contributes to the combination of 2 or combination of 3 in Category 1 below. For this visit I also reviewed old records and prior notes. Recent Labs     08/23/21  0425 08/21/21  1211 08/20/21  1623 08/20/21  1617   WBC 4.7   < > 19.8*  --    HGB 13.3*   < > 16.0  --       < > 233  --       < > 138  --    K 3.2*   < > 3.5  --       < > 105  --    CO2 27   < > 28  --    BUN 4*   < > 10  --    CREA 0.78*   < > 1.06  --    *   < > 112*  --    TBILI  --   --  1.5*  --    ALT  --   --  36  --    AP  --   --  82  --    LPSE  --   --   --  93    < > = values in this interval not displayed. Review of most recent CBC  Lab Results   Component Value Date/Time    WBC 4.7 08/23/2021 04:25 AM    HGB 13.3 (L) 08/23/2021 04:25 AM    HCT 40.2 (L) 08/23/2021 04:25 AM    PLATELET 758 13/48/6677 04:25 AM    MCV 87.2 08/23/2021 04:25 AM       Review of most recent BMP  Lab Results   Component Value Date/Time    Sodium 138 08/23/2021 04:25 AM    Potassium 3.2 (L) 08/23/2021 04:25 AM    Chloride 107 08/23/2021 04:25 AM    CO2 27 08/23/2021 04:25 AM    Anion gap 4 (L) 08/23/2021 04:25 AM    Glucose 108 (H) 08/23/2021 04:25 AM    BUN 4 (L) 08/23/2021 04:25 AM    Creatinine 0.78 (L) 08/23/2021 04:25 AM    GFR est AA >60 08/23/2021 04:25 AM    GFR est non-AA >60 08/23/2021 04:25 AM    Calcium 8.2 (L) 08/23/2021 04:25 AM       Review of most recent LFTs (and lipase if done)  Lab Results   Component Value Date/Time    ALT (SGPT) 36 08/20/2021 04:23 PM    AST (SGOT) 16 08/20/2021 04:23 PM    Alk.  phosphatase 82 08/20/2021 04:23 PM    Bilirubin, total 1.5 (H) 08/20/2021 04:23 PM     Lab Results   Component Value Date/Time    Lipase 93 08/20/2021 04:17 PM       No results found for: INR, APTT, CBIL, LCAD, NH4, TROPT, TROIQ, INREXT, INREXT    Review of most recent HgbA1c  No results found for: HBA1C, QLD5NLKQ, OXU1CVBH, PJI4WIUI    Nutritional assessment screen for wound healing issues:  Lab Results   Component Value Date/Time    Protein, total 8.8 (H) 08/20/2021 04:23 PM    Albumin 3.3 (L) 08/20/2021 04:23 PM       @lastcovr@  XR Results (most recent):  Results from Hospital Encounter encounter on 08/20/21    XR CHEST PORT    Narrative  Single view chest x-ray    Clinical indication: Epigastric abdominal pain, sepsis    FINDINGS: Single view of the chest show the lungs to be expanded and clear. No  pleural effusion or pneumothorax. The cardiac silhouette and mediastinum are  unremarkable. The bones are normal.    Impression  Normal single view chest x-ray. CT Results (most recent):  Results from Hospital Encounter encounter on 08/20/21    CT ABD PELV W CONT    Narrative  CT OF THE ABDOMEN AND PELVIS WITH CONTRAST. CLINICAL INDICATION: Right lower quadrant abdominal pain for two days, fever,  elevated white blood cell count    PROCEDURE: Serial thin section axial images obtained from the lung bases through  the proximal femurs following the administration of 100 cc of Isovue 370  intravenous contrast.  Radiation dose reduction techniques were used for this  study. Our CT scanners use one or all of the following: Automated exposure  control, adjusted of the mA and/or kV according to patient size, iterative  reconstruction    COMPARISON: No prior    FINDINGS:    CT ABDOMEN: There is a dilated, inflamed appendix that is fluid-filled with  marked para appendiceal fat inflammation. The appendix measures 10 mm in  transverse dimension. No periappendiceal abscess or free air present to strongly  suspect perforation. The liver, spleen, pancreas and kidneys are normal. There is no hydronephrosis.   The gallbladder is normal. The adrenal glands are normal. The bowel is normal in  course, caliber, and wall thickness. CT PELVIS: The rectum is normal. The bladder is normal. No interval hernia or  adenopathy. No free fluid accumulating dependently in the pelvis. No aggressive osseous lesions identified. Impression  Acute appendicitis with marked periappendiceal inflammatory fat  stranding in the right lower abdominal quadrant without definite abscess or  extraluminal air to indicate perforation. These important findings were called to the emergency room physician Dr. Judge Ortiz  by Dr. Liliane Rocha at 6:50 PM on 8/20/2021    US Results (most recent):  No results found for this or any previous visit.         Admission date (for inpatients): 8/20/2021   * No surgery date entered *  Procedure(s):  LAPAROSCOPIC APPENDECTOMY        ASSESSMENT/PLAN:  Problem List  Date Reviewed: 8/20/2021        Codes Class Noted    Acute appendicitis ICD-10-CM: K35.80  ICD-9-CM: 540.9  8/20/2021    Overview Signed 8/20/2021  8:15 PM by Isaac Ni MD     8/20/21 s/p lap appendectomy; Dr Tessy Guthrie abdominal pain ICD-10-CM: R10.31  ICD-9-CM: 789.03  8/20/2021        Fever ICD-10-CM: R50.9  ICD-9-CM: 780.60  8/20/2021        Right lower quadrant guarding ICD-10-CM: R19.8  ICD-9-CM: 789.9  8/20/2021        Leukocytosis ICD-10-CM: D72.829  ICD-9-CM: 288.60  8/20/2021        Tachycardia ICD-10-CM: R00.0  ICD-9-CM: 785.0  8/20/2021        * (Principal) Sepsis ([address] ICD-10-CM: A41.9  ICD-9-CM: 038.9, 995.91  8/20/2021            Principal Problem:    Sepsis ([address] (8/20/2021)    Active Problems:    Acute appendicitis (8/20/2021)      Overview: 8/20/21 s/p lap appendectomy; Dr Armando Christiansen      RLQ abdominal pain (8/20/2021)      Fever (8/20/2021)      Right lower quadrant guarding (8/20/2021)      Leukocytosis (8/20/2021)      Tachycardia (8/20/2021)           Number and Complexity of Problems addressed and   Risks of complications and/or morbidity of management        Sepsis with Fever, leukocytosis, RLQ pain, +guarding, CT evidence for acute appendicitis  He is s/p lap appendectomy on 8/20/21. He had sepsis    Continue inpt admission until fevers resolve  Zosyn--->Vanc/Zosyn/diflucan  Bld cultures with gm + cocci so far  Consult ID for help  COVID test pending    Soft regular diet    Daily labs  Drain teaching done and post-op instructions done with pt using with  # [id number]    PO Pepcid  SCDs    Home when fevers resolve            I have personally performed a face-to-face diagnostic evaluation and management  service on this patient. I have independently seen the patient. I have independently obtained the above history from the patient/family. I have independently examined the patient with above findings. I have independently reviewed data/labs for this patient and developed the above plan of care (MDM). Signed: Ashly Garcia.  Court Pearce MD, FACS

## 2021-08-23 NOTE — PROGRESS NOTES
Chart review complete, pending ID consult, iv antibx continue. CM will continue to follow for dc needs.

## 2021-08-23 NOTE — PROGRESS NOTES
Problem: Pain  Goal: *Control of Pain  Outcome: Progressing Towards Goal  Goal: *PALLIATIVE CARE:  Alleviation of Pain  Outcome: Progressing Towards Goal     Problem: Patient Education: Go to Patient Education Activity  Goal: Patient/Family Education  Outcome: Progressing Towards Goal     Problem: Falls - Risk of  Goal: *Absence of Falls  Description: Document Conrad Ramos Fall Risk and appropriate interventions in the flowsheet.   Outcome: Progressing Towards Goal  Note: Fall Risk Interventions:            Medication Interventions: Patient to call before getting OOB, Teach patient to arise slowly                   Problem: Patient Education: Go to Patient Education Activity  Goal: Patient/Family Education  Outcome: Progressing Towards Goal     Problem: General Infection Care Plan (Adult and Pediatric)  Goal: Improvement in signs and symptoms of infection  Outcome: Progressing Towards Goal  Goal: *Optimize nutritional status  Outcome: Progressing Towards Goal     Problem: Patient Education: Go to Patient Education Activity  Goal: Patient/Family Education  Outcome: Progressing Towards Goal     Problem: Surgical Pathway Post-Op Day 2 through Discharge  Goal: Activity/Safety  Outcome: Progressing Towards Goal  Goal: Nutrition/Diet  Outcome: Progressing Towards Goal  Goal: Discharge Planning  Outcome: Progressing Towards Goal  Goal: Medications  Outcome: Progressing Towards Goal  Goal: Respiratory  Outcome: Progressing Towards Goal  Goal: Treatments/Interventions/Procedures  Outcome: Progressing Towards Goal  Goal: Psychosocial  Outcome: Progressing Towards Goal  Goal: *No signs and symptoms of infection or wound complications  Outcome: Progressing Towards Goal  Goal: *Optimal pain control at patient's stated goal  Outcome: Progressing Towards Goal  Goal: *Adequate urinary output (equal to or greater than 30 milliliters/hour)  Outcome: Progressing Towards Goal  Goal: *Hemodynamically stable  Outcome: Progressing Towards Goal  Goal: *Tolerating diet  Outcome: Progressing Towards Goal  Goal: *Demonstrates progressive activity  Outcome: Progressing Towards Goal  Goal: *Lungs clear or at baseline  Outcome: Progressing Towards Goal

## 2021-08-24 LAB
ALBUMIN SERPL-MCNC: 2.8 G/DL (ref 3.5–5)
ALBUMIN/GLOB SERPL: 0.6 {RATIO} (ref 1.2–3.5)
ALP SERPL-CCNC: 145 U/L (ref 50–136)
ALT SERPL-CCNC: 121 U/L (ref 12–65)
ANION GAP SERPL CALC-SCNC: 7 MMOL/L (ref 7–16)
AST SERPL-CCNC: 143 U/L (ref 15–37)
BILIRUB SERPL-MCNC: 0.9 MG/DL (ref 0.2–1.1)
BUN SERPL-MCNC: 9 MG/DL (ref 6–23)
CALCIUM SERPL-MCNC: 8.9 MG/DL (ref 8.3–10.4)
CHLORIDE SERPL-SCNC: 107 MMOL/L (ref 98–107)
CO2 SERPL-SCNC: 23 MMOL/L (ref 21–32)
CREAT SERPL-MCNC: 0.68 MG/DL (ref 0.8–1.5)
ERYTHROCYTE [DISTWIDTH] IN BLOOD BY AUTOMATED COUNT: 13.2 % (ref 11.9–14.6)
GLOBULIN SER CALC-MCNC: 4.4 G/DL (ref 2.3–3.5)
GLUCOSE SERPL-MCNC: 95 MG/DL (ref 65–100)
HAV IGM SER QL: NONREACTIVE
HBV CORE IGM SER QL: NONREACTIVE
HBV SURFACE AG SER QL: NONREACTIVE
HCT VFR BLD AUTO: 39 % (ref 41.1–50.3)
HCV AB SER QL: NONREACTIVE
HGB BLD-MCNC: 13.2 G/DL (ref 13.6–17.2)
HIV 1+2 AB+HIV1 P24 AG SERPL QL IA: NONREACTIVE
HIV12 RESULT COMMENT, HHIVC: ABNORMAL
MCH RBC QN AUTO: 29.3 PG (ref 26.1–32.9)
MCHC RBC AUTO-ENTMCNC: 33.8 G/DL (ref 31.4–35)
MCV RBC AUTO: 86.7 FL (ref 79.6–97.8)
NRBC # BLD: 0 K/UL (ref 0–0.2)
PLATELET # BLD AUTO: 189 K/UL (ref 150–450)
PMV BLD AUTO: 10.9 FL (ref 9.4–12.3)
POTASSIUM SERPL-SCNC: 3.2 MMOL/L (ref 3.5–5.1)
PROT SERPL-MCNC: 7.2 G/DL (ref 6.3–8.2)
RBC # BLD AUTO: 4.5 M/UL (ref 4.23–5.6)
SARS-COV-2, COV2: NOT DETECTED
SODIUM SERPL-SCNC: 137 MMOL/L (ref 136–145)
SPECIMEN SOURCE, FCOV2M: NORMAL
WBC # BLD AUTO: 6.3 K/UL (ref 4.3–11.1)

## 2021-08-24 PROCEDURE — 85027 COMPLETE CBC AUTOMATED: CPT

## 2021-08-24 PROCEDURE — 74011250636 HC RX REV CODE- 250/636: Performed by: SURGERY

## 2021-08-24 PROCEDURE — 36415 COLL VENOUS BLD VENIPUNCTURE: CPT

## 2021-08-24 PROCEDURE — 80053 COMPREHEN METABOLIC PANEL: CPT

## 2021-08-24 PROCEDURE — 80074 ACUTE HEPATITIS PANEL: CPT

## 2021-08-24 PROCEDURE — 74011250637 HC RX REV CODE- 250/637: Performed by: NURSE PRACTITIONER

## 2021-08-24 PROCEDURE — 87389 HIV-1 AG W/HIV-1&-2 AB AG IA: CPT

## 2021-08-24 PROCEDURE — 65270000029 HC RM PRIVATE

## 2021-08-24 PROCEDURE — 74011250637 HC RX REV CODE- 250/637: Performed by: SURGERY

## 2021-08-24 RX ADMIN — OXYCODONE 10 MG: 5 TABLET ORAL at 13:25

## 2021-08-24 RX ADMIN — VANCOMYCIN HYDROCHLORIDE 1000 MG: 1 INJECTION, POWDER, LYOPHILIZED, FOR SOLUTION INTRAVENOUS at 22:20

## 2021-08-24 RX ADMIN — VANCOMYCIN HYDROCHLORIDE 1000 MG: 1 INJECTION, POWDER, LYOPHILIZED, FOR SOLUTION INTRAVENOUS at 13:25

## 2021-08-24 RX ADMIN — OXYCODONE 5 MG: 5 TABLET ORAL at 01:29

## 2021-08-24 RX ADMIN — FAMOTIDINE 20 MG: 20 TABLET, FILM COATED ORAL at 08:45

## 2021-08-24 RX ADMIN — VANCOMYCIN HYDROCHLORIDE 1000 MG: 1 INJECTION, POWDER, LYOPHILIZED, FOR SOLUTION INTRAVENOUS at 05:15

## 2021-08-24 RX ADMIN — OXYCODONE 5 MG: 5 TABLET ORAL at 05:20

## 2021-08-24 RX ADMIN — OXYCODONE 10 MG: 5 TABLET ORAL at 22:26

## 2021-08-24 RX ADMIN — DEXTROSE MONOHYDRATE, SODIUM CHLORIDE, AND POTASSIUM CHLORIDE 30 ML/HR: 50; 4.5; 1.49 INJECTION, SOLUTION INTRAVENOUS at 05:18

## 2021-08-24 NOTE — PROGRESS NOTES
END OF SHIFT NOTE:    INTAKE/OUTPUT  08/23 0701 - 08/24 0700  In: 9998 [P.O.:1460]  Out: 4694 [Urine:5850; Drains:20]  Voiding: YES  Catheter: NO  Drain:   Ross January #1 08/20/21 Anterior; Lower Abdomen (Active)   Site Assessment Clean, dry, & intact 08/24/21 0845   Dressing Status Clean, dry, & intact 08/24/21 0845   Drainage Description Serosanguinous 08/24/21 0845   Jermaine Drain Airleak No 08/24/21 0845   Status Patent;Draining 08/24/21 0845   Y Connector Used No 08/24/21 0845   Output (ml) 5 ml 08/24/21 1752               Flatus: Patient does have flatus present. Stool:  1 occurrences. Characteristics:  Stool Assessment  Stool Appearance: Loose    Emesis: 0 occurrences. Characteristics:        VITAL SIGNS  Patient Vitals for the past 12 hrs:   Temp Pulse Resp BP SpO2   08/24/21 1510 98.3 °F (36.8 °C) 86 18 120/76 98 %   08/24/21 1133 98.7 °F (37.1 °C) 80 18 126/82 97 %       Pain Assessment  Pain Intensity 1: 3 (08/24/21 1525)  Pain Location 1: Abdomen  Pain Intervention(s) 1: Medication (see MAR), Emotional support  Patient Stated Pain Goal: 0    Ambulating  Yes    Shift report given to oncoming nurse at the bedside.     Brendon Jean-Baptiste RN

## 2021-08-24 NOTE — PROGRESS NOTES
Pharmacokinetic Consult to Pharmacist    Anamika Etienne is a 32 y.o. male being treated with vancomycin. Height: 5' 4\" (162.6 cm)  Weight: 77.1 kg (170 lb)  Lab Results   Component Value Date/Time    BUN 4 (L) 08/23/2021 04:25 AM    Creatinine 0.78 (L) 08/23/2021 04:25 AM    WBC 4.7 08/23/2021 04:25 AM    Procalcitonin 13.73 08/20/2021 04:17 PM    Lactic acid 1.7 08/20/2021 04:23 PM      Estimated Creatinine Clearance: 128.9 mL/min (A) (based on SCr of 0.78 mg/dL (L)). Lab Results   Component Value Date/Time    Vancomycin,trough 4.5 (L) 08/23/2021 08:00 PM       Day 2 of vancomycin. Goal trough is 10-20. Vanc trough sub-therapeutic. Changed to Vanc 1g IV q8h. Will continue to follow patient and order levels when clinically indicated.       Thank you,  Gogo Mason, PharmD

## 2021-08-24 NOTE — PROGRESS NOTES
Pt Aox4, oob ad arlene. VSS and monitored throughout shift. Pt w/ ANA drain. Output monitored. Pt resting in bed w/ no complaints.

## 2021-08-24 NOTE — PROGRESS NOTES
H&P/Consult Note/Progress Note/Office Note:   Yulia Mattson  MRN: [id number]  :1990  Age:31 y.o.    HPI: Yulia Mattson is a 32 y.o. male who is s/p lap appendectomy on 21. Prior to surgery he came to the ER on 21 with a 3day h/o constant, severe, progressive RLQ abd pain. Nothing in particular made his pain better or worse. He had nausea but no vomiting. He had associated fever  He denied prior abdominal surgery. He had a markedly elevated WBC and a CT scan shown below which identified findings consistent with acute appendicitis. He had signs of sepsis. 21 CXR  Shows lungs to be expanded and clear. No pleural effusion or pneumothorax. The cardiac silhouette and mediastinum are unremarkable. The bones are normal.     IMPRESSION:  Normal single view chest x-ray. 21 CT abd/pelvis with IV contrast  There is a dilated, inflamed appendix that is fluid-filled with marked para appendiceal fat inflammation. The appendix measures 10 mm in transverse dimension. No periappendiceal abscess or free air present to strongly suspect perforation.     The liver, spleen, pancreas and kidneys are normal. There is no hydronephrosis. The gallbladder is normal. The adrenal glands are normal. The bowel is normal in course, caliber, and wall thickness.     The rectum is normal. The bladder is normal. No interval hernia or adenopathy. No free fluid accumulating dependently in the pelvis. o aggressive osseous lesions identified.     IMPRESSION  Acute appendicitis with marked periappendiceal inflammatory fat stranding in RLQ   without definite abscess or extraluminal air to indicate perforation.          Additional hx:  21 POD1; Feels much better; still intermittent fever; WBC elevated but better; Cont IV Abx;  # [id number]  21 POD2; fevers on Zosyn; bld cultures with gm + cocci (possible enterococcus);   Change to Vanc/Zosyn/Diflucan and consult ID  21 POD3; fevers on Vanc/Zosyn/Diflucan started yesterday; COVID test pending; ID consulted   8/24/21 POD4; AF on Vanc; bld culture with gm + cocci but no ID yet; On IV Vanc; hepatitis panel negative, HIV NR, and repeat bld cx pending; ID following; possible drug fever from beta lactam               History reviewed. No pertinent past medical history. History reviewed. No pertinent surgical history. Current Facility-Administered Medications   Medication Dose Route Frequency    famotidine (PEPCID) tablet 20 mg  20 mg Oral BID    morphine injection 2-5 mg  2-5 mg IntraVENous Q1H PRN    vancomycin (VANCOCIN) 1,000 mg in 0.9% sodium chloride 250 mL (VIAL-MATE)  1,000 mg IntraVENous Q8H    ondansetron (ZOFRAN) injection 4 mg  4 mg IntraVENous Q4H PRN    acetaminophen (TYLENOL) tablet 1,000 mg  1,000 mg Oral Q6H PRN    oxyCODONE IR (ROXICODONE) tablet 5-10 mg  5-10 mg Oral Q4H PRN     Patient has no known allergies. Social History     Socioeconomic History    Marital status:      Spouse name: Not on file    Number of children: Not on file    Years of education: Not on file    Highest education level: Not on file     Social Determinants of Health     Financial Resource Strain:     Difficulty of Paying Living Expenses:    Food Insecurity:     Worried About Running Out of Food in the Last Year:     [address] in the Last Year:    Transportation Needs:     Lack of Transportation (Medical):      Lack of Transportation (Non-Medical):    Physical Activity:     Days of Exercise per Week:     Minutes of Exercise per Session:    Stress:     Feeling of Stress :    Social Connections:     Frequency of Communication with Friends and Family:     Frequency of Social Gatherings with Friends and Family:     Attends Yazidi Services:     Active Member of Clubs or Organizations:     Attends Club or Organization Meetings:     Marital Status:      Social History     Tobacco Use   Smoking Status Not on file     No family history on file. ROS: The patient has no difficulty with chest pain or shortness of breath. No fever or chills. Comprehensive review of systems was otherwise unremarkable except as noted above. Physical Exam:   Visit Vitals  /79   Pulse 85   Temp 98.3 °F (36.8 °C)   Resp 18   Ht 5' 4\" (1.626 m)   Wt 170 lb (77.1 kg)   SpO2 98%   BMI 29.18 kg/m²     Vitals:    08/23/21 1557 08/23/21 2032 08/23/21 2253 08/24/21 0408   BP: (!) 109/56 (!) 132/92 119/69 122/79   Pulse: 98 97 77 85   Resp: 20 18 18 18   Temp: 98.6 °F (37 °C) 99.4 °F (37.4 °C) 99 °F (37.2 °C) 98.3 °F (36.8 °C)   SpO2: 98% 98% 96% 98%   Weight:       Height:         08/23 1901 - 08/24 0700  In: 980 [P.O.:980]  Out: 1875 [Urine:1875]  08/22 0701 - 08/23 1900  In: 5176 [P.O.:960; I.V.:4216]  Out: 4414 [NUIHL:9443; Drains:11]    Constitutional: Alert, oriented, cooperative patient in no acute distress; appears stated age    Eyes:Sclera are clear. EOMs intact  ENMT: no external lesions gross hearing normal; no obvious neck masses, no ear or lip lesions, nares normal  CV: RRR. Normal perfusion  Resp: No JVD. Breathing is  non-labored; no audible wheezing. GI: non-distended; non-tender; Incisions OK; Jermaine drain serosang     Musculoskeletal: unremarkable with normal function. No embolic signs or cyanosis.    Neuro:  Oriented; moves all 4; no focal deficits  Psychiatric: normal affect and mood, no memory impairment    Recent vitals (if inpt):  Patient Vitals for the past 24 hrs:   BP Temp Pulse Resp SpO2   08/24/21 0408 122/79 98.3 °F (36.8 °C) 85 18 98 %   08/23/21 2253 119/69 99 °F (37.2 °C) 77 18 96 %   08/23/21 2032 (!) 132/92 99.4 °F (37.4 °C) 97 18 98 %   08/23/21 1557 (!) 109/56 98.6 °F (37 °C) 98 20 98 %   08/23/21 1125 115/79 99.6 °F (37.6 °C) 92 18 98 %   08/23/21 0729 130/83 99.4 °F (37.4 °C) 74 18 97 %       Amount and/or Complexity of Data Reviewed and Analyzed:  I reviewed and analyzed all of the unique labs and radiologic studies that are shown below as well as any that are in the HPI, and any that are in the expanded problem list below  *Each unique test, order, or document contributes to the combination of 2 or combination of 3 in Category 1 below. For this visit I also reviewed old records and prior notes. Recent Labs     08/23/21  0425   WBC 4.7   HGB 13.3*         K 3.2*      CO2 27   BUN 4*   CREA 0.78*   *     Review of most recent CBC  Lab Results   Component Value Date/Time    WBC 4.7 08/23/2021 04:25 AM    HGB 13.3 (L) 08/23/2021 04:25 AM    HCT 40.2 (L) 08/23/2021 04:25 AM    PLATELET 423 96/34/8690 04:25 AM    MCV 87.2 08/23/2021 04:25 AM       Review of most recent BMP  Lab Results   Component Value Date/Time    Sodium 138 08/23/2021 04:25 AM    Potassium 3.2 (L) 08/23/2021 04:25 AM    Chloride 107 08/23/2021 04:25 AM    CO2 27 08/23/2021 04:25 AM    Anion gap 4 (L) 08/23/2021 04:25 AM    Glucose 108 (H) 08/23/2021 04:25 AM    BUN 4 (L) 08/23/2021 04:25 AM    Creatinine 0.78 (L) 08/23/2021 04:25 AM    GFR est AA >60 08/23/2021 04:25 AM    GFR est non-AA >60 08/23/2021 04:25 AM    Calcium 8.2 (L) 08/23/2021 04:25 AM       Review of most recent LFTs (and lipase if done)  Lab Results   Component Value Date/Time    ALT (SGPT) 36 08/20/2021 04:23 PM    AST (SGOT) 16 08/20/2021 04:23 PM    Alk.  phosphatase 82 08/20/2021 04:23 PM    Bilirubin, total 1.5 (H) 08/20/2021 04:23 PM     Lab Results   Component Value Date/Time    Lipase 93 08/20/2021 04:17 PM       No results found for: INR, APTT, CBIL, LCAD, NH4, TROPT, TROIQ, INREXT, INREXT    Review of most recent HgbA1c  No results found for: HBA1C, FEA9TXPI, OIG7JJLX, IVR1GCMQ    Nutritional assessment screen for wound healing issues:  Lab Results   Component Value Date/Time    Protein, total 8.8 (H) 08/20/2021 04:23 PM    Albumin 3.3 (L) 08/20/2021 04:23 PM       @lastcovr@  XR Results (most recent):  Results from [facility] Encounter encounter on 08/20/21    XR CHEST PORT    Narrative  Single view chest x-ray    Clinical indication: Epigastric abdominal pain, sepsis    FINDINGS: Single view of the chest show the lungs to be expanded and clear. No  pleural effusion or pneumothorax. The cardiac silhouette and mediastinum are  unremarkable. The bones are normal.    Impression  Normal single view chest x-ray. CT Results (most recent):  Results from Hospital Encounter encounter on 08/20/21    CT ABD PELV W CONT    Narrative  CT OF THE ABDOMEN AND PELVIS WITH CONTRAST. CLINICAL INDICATION: Right lower quadrant abdominal pain for two days, fever,  elevated white blood cell count    PROCEDURE: Serial thin section axial images obtained from the lung bases through  the proximal femurs following the administration of 100 cc of Isovue 370  intravenous contrast.  Radiation dose reduction techniques were used for this  study. Our CT scanners use one or all of the following: Automated exposure  control, adjusted of the mA and/or kV according to patient size, iterative  reconstruction    COMPARISON: No prior    FINDINGS:    CT ABDOMEN: There is a dilated, inflamed appendix that is fluid-filled with  marked para appendiceal fat inflammation. The appendix measures 10 mm in  transverse dimension. No periappendiceal abscess or free air present to strongly  suspect perforation. The liver, spleen, pancreas and kidneys are normal. There is no hydronephrosis. The gallbladder is normal. The adrenal glands are normal. The bowel is normal in  course, caliber, and wall thickness. CT PELVIS: The rectum is normal. The bladder is normal. No interval hernia or  adenopathy. No free fluid accumulating dependently in the pelvis. No aggressive osseous lesions identified. Impression  Acute appendicitis with marked periappendiceal inflammatory fat  stranding in the right lower abdominal quadrant without definite abscess or  extraluminal air to indicate perforation.     These important findings were called to the emergency room physician Dr. Daryl Yang  by Dr. Gilbert Naranjo at 6:50 PM on 8/20/2021    US Results (most recent):  No results found for this or any previous visit. Admission date (for inpatients): 8/20/2021   * No surgery date entered *  Procedure(s):  LAPAROSCOPIC APPENDECTOMY        ASSESSMENT/PLAN:  Problem List  Date Reviewed: 8/20/2021        Codes Class Noted    Acute appendicitis ICD-10-CM: K35.80  ICD-9-CM: 540.9  8/20/2021    Overview Signed 8/20/2021  8:15 PM by Jhon Mancilla MD     8/20/21 s/p lap appendectomy; Dr Kang Lipoma abdominal pain ICD-10-CM: R10.31  ICD-9-CM: 789.03  8/20/2021        Fever ICD-10-CM: R50.9  ICD-9-CM: 780.60  8/20/2021        Right lower quadrant guarding ICD-10-CM: R19.8  ICD-9-CM: 789.9  8/20/2021        Leukocytosis ICD-10-CM: D72.829  ICD-9-CM: 288.60  8/20/2021        Tachycardia ICD-10-CM: R00.0  ICD-9-CM: 785.0  8/20/2021        * (Principal) Sepsis ([facility] ICD-10-CM: A41.9  ICD-9-CM: 038.9, 995.91  8/20/2021            Principal Problem:    Sepsis ([facility] [address] [facility] (8/20/2021)    Active Problems:    Acute appendicitis (8/20/2021)      Overview: 8/20/21 s/p lap appendectomy; Dr Shanthi Hernandez      RLQ abdominal pain (8/20/2021)      Fever (8/20/2021)      Right lower quadrant guarding (8/20/2021)      Leukocytosis (8/20/2021)      Tachycardia (8/20/2021)           Number and Complexity of Problems addressed and   Risks of complications and/or morbidity of management        Sepsis with Fever, leukocytosis, RLQ pain, +guarding, CT evidence for acute appendicitis  He is s/p lap appendectomy on 8/20/21. He initially had sepsis.     No additional fevers  Rapid COVID testing negative; PCR pending  ID following and suspects drug fever  Hepatitis panel negative; HIV non-reactive  Repeat bld cx pending   Zosyn--->Vanc/Zosyn/diflucan-->Vanc alone  Bld cultures with gm + cocci so far--> No identification yet  Repeat CT scan abd/pelvis if needed if more fevers      Soft regular diet    Daily labs  Drain teaching done and post-op instructions done with pt using with  # [id number]    PO Pepcid  SCDs    Home when OK with ID            I have personally performed a face-to-face diagnostic evaluation and management  service on this patient. I have independently seen the patient. I have independently obtained the above history from the patient/family. I have independently examined the patient with above findings. I have independently reviewed data/labs for this patient and developed the above plan of care (MDM). Signed: Jennifer Dailey.  Armando Christiansen MD, FACS

## 2021-08-24 NOTE — PROGRESS NOTES
Infectious Disease Note    Today's Date: 2021   Admit Date: 2021    Impression:   · Persistent fevers  · Anaerobic GPC bacteremia , source GI  · Acute appendicitis s/p lap appendectomy . No perforation found. · Mild transaminitis      Plan:   ·  Continue Vancomycin for now. I will discuss duration with Dr Tavares. · Fever curve has improved. Would like to wait for another 24h     Anti-infectives:   · Vanc -  · CTX   · Zosyn -    Subjective:   Afebrile since yesterday early AM. NO complaints except for a lot of gas. Patient is a 32 y.o. male with no medical hx who was admitted on  for acute appendicitis. CT was done to diagnose and this did not reveal perforation. Dr Flavio Sheets performed a lap appendectomy on . Intra-OP findings with reversible ischemia to cecum, no perforation. Admitting BCs  With / bottle + GPC anaerobic. PCR with strept species. Pt was placed on Zosyn but continued to spike regular fevers. WBC is normalized. On exam, pt is healing well, no specific complaints.  was used. Rapid COVID negative. No Known Allergies     Review of Systems:  A comprehensive review of systems was negative except for that written in the History of Present Illness. Objective:     Visit Vitals  /67   Pulse 72   Temp 98.3 °F (36.8 °C)   Resp 18   Ht 5' 4\" (1.626 m)   Wt 77.1 kg (170 lb)   SpO2 98%   BMI 29.18 kg/m²     Temp (24hrs), Av.9 °F (37.2 °C), Min:98.3 °F (36.8 °C), Max:99.6 °F (37.6 °C)       Lines:  Peripheral IV:       Physical Exam:    General:  Alert, cooperative, well noursished, well developed, appears stated age   Eyes:  Sclera anicteric. Pupils equally round and reactive to light. Mouth/Throat: Mucous membranes normal, oral pharynx clear   Neck: Supple   Lungs:   Clear to auscultation bilaterally, good effort   CV:  Regular rate and rhythm,no murmur, click, rub or gallop   Abdomen:   Soft, non-tender.  bowel sounds normal. non-distended. No guarding, ANA drain in place    Extremities: No cyanosis or edema   Skin: Skin color, texture, turgor normal. no acute rash or lesions   Lymph nodes: Cervical and supraclavicular normal   Musculoskeletal: No swelling or deformity   Lines/Devices:  Intact, no erythema, drainage or tenderness   Psych: Alert and oriented, normal mood affect given the setting       Data Review:     CBC:  Recent Labs     08/24/21  0550 08/23/21  0425 08/22/21  0430   WBC 6.3 4.7 6.9   HGB 13.2* 13.3* 12.6*   HCT 39.0* 40.2* 38.3*    150 146*       BMP:  Recent Labs     08/24/21  0550 08/23/21  0425 08/22/21  0430   CREA 0.68* 0.78* 0.95   BUN 9 4* 7    138 137*   K 3.2* 3.2* 3.1*    107 105   CO2 23 27 26   AGAP 7 4* 6*   GLU 95 108* 135*       LFTS:  Recent Labs     08/24/21  0550   TBILI 0.9   *   *   TP 7.2   ALB 2.8*       Microbiology:     All Micro Results     Procedure Component Value Units Date/Time    SARS-COV-2, PCR [[id number]] Collected: 08/23/21 1757    Order Status: Completed Specimen: Nasopharyngeal Updated: 08/24/21 0935     Specimen source Nasopharyngeal        SARS-CoV-2 Not detected        Comment:      The specimen is NEGATIVE for SARS-CoV-2, the novel coronavirus associated with COVID-19. This test has been authorized by the FDA under an Emergency Use Authorization (EUA) for use by authorized laboratories.         Fact sheet for Healthcare Providers: ConventionUpdate.co.nz       Fact sheet for Patients: ConventionUpdate.co.nz       Methodology: RT-PCR         CULTURE, URINE [[id number]] Collected: 08/23/21 1056    Order Status: Completed Specimen: Urine from Clean catch Updated: 08/24/21 0917     Special Requests: NO SPECIAL REQUESTS        Culture result: NO GROWTH 1 DAY       BLOOD CULTURE [[id number]] Collected: 08/20/21 2000    Order Status: Completed Specimen: Blood Updated: 08/24/21 0656     Special Requests: -- LEFT  HAND       Culture result: NO GROWTH 4 DAYS       BLOOD CULTURE [[id number]]  (Abnormal) Collected: 08/20/21 1623    Order Status: Completed Specimen: Blood Updated: 08/23/21 1132     Special Requests: --        NO SPECIAL REQUESTS  LEFT  Antecubital       GRAM STAIN GRAM POSITIVE COCCI         ANAEROBIC BOTTLE POSITIVE               RESULTS VERIFIED, PHONED TO AND READ BACK BY Nayana Mccann RN ON 8/21/22 @1610, JAB           Culture result:       ANAEROBIC GRAM POSITIVE COCCI SENT TO [facility] FOR TESTING 8/23/21                  Refer to Blood Culture ID Panel Accession  [id number]      COVID-19 RAPID TEST [[id number]] Collected: 08/23/21 0935    Order Status: Completed Specimen: Nasopharyngeal Updated: 08/23/21 1015     Specimen source Nasopharyngeal        COVID-19 rapid test Not detected        Comment:      The specimen is NEGATIVE for SARS-CoV-2, the novel coronavirus associated with COVID-19. A negative result does not rule out COVID-19. This test has been authorized by the FDA under an Emergency Use Authorization (EUA) for use by authorized laboratories. Fact sheet for Healthcare Providers: ConventionUpdate.co.nz  Fact sheet for Patients: ConventionUpdate.co.nz       Methodology: Isothermal Nucleic Acid Amplification         BLOOD CULTURE ID PANEL [[id number]]  (Abnormal) Collected: 08/20/21 1623    Order Status: Completed Specimen: Blood Updated: 08/22/21 0659     Acc. no. from Micro Order [id number]     Streptococcus Detected        Comment: RESULTS VERIFIED, PHONED TO AND READ BACK BY  TARA Ruelas RN BY AALIYAH AT [phone]          INTERPRETATION       Gram positive cocci. Identified by realtime PCR as Streptococcus species (not agalactiae, pyogenes, or pneumoniae).            Comment: Consider discontinuation of IV vancomycin and using an anti-streptococcal beta-lactam (ceftriaxone/cefotaxime (peds))             Imaging:   See HPI    Signed By: Chinedu Gonzalez NP     August 24, 2021

## 2021-08-25 VITALS
HEART RATE: 91 BPM | TEMPERATURE: 97.9 F | WEIGHT: 170 LBS | BODY MASS INDEX: 29.02 KG/M2 | SYSTOLIC BLOOD PRESSURE: 115 MMHG | RESPIRATION RATE: 18 BRPM | OXYGEN SATURATION: 98 % | HEIGHT: 64 IN | DIASTOLIC BLOOD PRESSURE: 71 MMHG

## 2021-08-25 LAB
ANION GAP SERPL CALC-SCNC: 8 MMOL/L (ref 7–16)
BACTERIA SPEC CULT: NORMAL
BUN SERPL-MCNC: 13 MG/DL (ref 6–23)
CALCIUM SERPL-MCNC: 8.9 MG/DL (ref 8.3–10.4)
CHLORIDE SERPL-SCNC: 107 MMOL/L (ref 98–107)
CO2 SERPL-SCNC: 23 MMOL/L (ref 21–32)
CREAT SERPL-MCNC: 0.65 MG/DL (ref 0.8–1.5)
ERYTHROCYTE [DISTWIDTH] IN BLOOD BY AUTOMATED COUNT: 13.2 % (ref 11.9–14.6)
GLUCOSE SERPL-MCNC: 91 MG/DL (ref 65–100)
HCT VFR BLD AUTO: 41.5 % (ref 41.1–50.3)
HGB BLD-MCNC: 14 G/DL (ref 13.6–17.2)
MCH RBC QN AUTO: 29.1 PG (ref 26.1–32.9)
MCHC RBC AUTO-ENTMCNC: 33.7 G/DL (ref 31.4–35)
MCV RBC AUTO: 86.3 FL (ref 79.6–97.8)
NRBC # BLD: 0 K/UL (ref 0–0.2)
PLATELET # BLD AUTO: 244 K/UL (ref 150–450)
PMV BLD AUTO: 10.6 FL (ref 9.4–12.3)
POTASSIUM SERPL-SCNC: 3.5 MMOL/L (ref 3.5–5.1)
RBC # BLD AUTO: 4.81 M/UL (ref 4.23–5.6)
SERVICE CMNT-IMP: NORMAL
SODIUM SERPL-SCNC: 138 MMOL/L (ref 136–145)
VANCOMYCIN TROUGH SERPL-MCNC: 14.6 UG/ML (ref 5–20)
WBC # BLD AUTO: 7.6 K/UL (ref 4.3–11.1)

## 2021-08-25 PROCEDURE — 74011250636 HC RX REV CODE- 250/636: Performed by: SURGERY

## 2021-08-25 PROCEDURE — 36415 COLL VENOUS BLD VENIPUNCTURE: CPT

## 2021-08-25 PROCEDURE — 80202 ASSAY OF VANCOMYCIN: CPT

## 2021-08-25 PROCEDURE — 74011250637 HC RX REV CODE- 250/637: Performed by: SURGERY

## 2021-08-25 PROCEDURE — 85027 COMPLETE CBC AUTOMATED: CPT

## 2021-08-25 PROCEDURE — 74011250637 HC RX REV CODE- 250/637: Performed by: NURSE PRACTITIONER

## 2021-08-25 PROCEDURE — 80048 BASIC METABOLIC PNL TOTAL CA: CPT

## 2021-08-25 RX ORDER — LINEZOLID 600 MG/1
600 TABLET, FILM COATED ORAL EVERY 12 HOURS
Qty: 20 TABLET | Refills: 0 | Status: SHIPPED | OUTPATIENT
Start: 2021-08-25 | End: 2021-08-25

## 2021-08-25 RX ORDER — HYDROCODONE BITARTRATE AND ACETAMINOPHEN 5; 325 MG/1; MG/1
1-2 TABLET ORAL EVERY 8 HOURS
Qty: 18 TABLET | Refills: 0 | Status: SHIPPED | OUTPATIENT
Start: 2021-08-25 | End: 2021-08-28

## 2021-08-25 RX ORDER — LINEZOLID 600 MG/1
600 TABLET, FILM COATED ORAL EVERY 12 HOURS
Qty: 20 TABLET | Refills: 0 | Status: SHIPPED | OUTPATIENT
Start: 2021-08-25 | End: 2021-09-04

## 2021-08-25 RX ORDER — LINEZOLID 600 MG/1
600 TABLET, FILM COATED ORAL EVERY 12 HOURS
Status: DISCONTINUED | OUTPATIENT
Start: 2021-08-25 | End: 2021-08-25 | Stop reason: HOSPADM

## 2021-08-25 RX ADMIN — OXYCODONE 10 MG: 5 TABLET ORAL at 04:45

## 2021-08-25 RX ADMIN — VANCOMYCIN HYDROCHLORIDE 1000 MG: 1 INJECTION, POWDER, LYOPHILIZED, FOR SOLUTION INTRAVENOUS at 06:30

## 2021-08-25 RX ADMIN — LINEZOLID 600 MG: 600 TABLET, FILM COATED ORAL at 12:30

## 2021-08-25 RX ADMIN — FAMOTIDINE 20 MG: 20 TABLET, FILM COATED ORAL at 09:12

## 2021-08-25 NOTE — PROGRESS NOTES
Pt is medically ready for dc home, Rx coupons given for Zyvox and Gilsum for [facility] pharmacy pt used translation on phone and he states he will be able to afford medications with coupons.   No other dc needs noted    Care Management Interventions  PCP Verified by CM:  (other physician)  Mode of Transport at Discharge:  (family)  Transition of Care Consult (CM Consult): Discharge Planning  Discharge Durable Medical Equipment: No  Physical Therapy Consult: No  Occupational Therapy Consult: No  Speech Therapy Consult: No  Current Support Network: Lives with Spouse, Own Home  Discharge Location  Discharge Placement: Home

## 2021-08-25 NOTE — PROGRESS NOTES
Chart review complete, per ID pt ready for DC will need med voucher for dc for PO meds at DC. Pending DC orders and CM will assist with med vouchers.

## 2021-08-25 NOTE — PROGRESS NOTES
H&P/Consult Note/Progress Note/Office Note:   Adis Yang  MRN: [id number]  :1990  Age:31 y.o.    HPI: Adis Yang is a 32 y.o. male who is s/p lap appendectomy on 21. Prior to surgery he came to the ER on 21 with a 3day h/o constant, severe, progressive RLQ abd pain. Nothing in particular made his pain better or worse. He had nausea but no vomiting. He had associated fever  He denied prior abdominal surgery. He had a markedly elevated WBC and a CT scan shown below which identified findings consistent with acute appendicitis. He had signs of sepsis. 21 CXR  Shows lungs to be expanded and clear. No pleural effusion or pneumothorax. The cardiac silhouette and mediastinum are unremarkable. The bones are normal.     IMPRESSION:  Normal single view chest x-ray. 21 CT abd/pelvis with IV contrast  There is a dilated, inflamed appendix that is fluid-filled with marked para appendiceal fat inflammation. The appendix measures 10 mm in transverse dimension. No periappendiceal abscess or free air present to strongly suspect perforation.     The liver, spleen, pancreas and kidneys are normal. There is no hydronephrosis. The gallbladder is normal. The adrenal glands are normal. The bowel is normal in course, caliber, and wall thickness.     The rectum is normal. The bladder is normal. No interval hernia or adenopathy. No free fluid accumulating dependently in the pelvis. o aggressive osseous lesions identified.     IMPRESSION  Acute appendicitis with marked periappendiceal inflammatory fat stranding in RLQ   without definite abscess or extraluminal air to indicate perforation.          Additional hx:  21 POD1; Feels much better; still intermittent fever; WBC elevated but better; Cont IV Abx;  # [id number]  21 POD2; fevers on Zosyn; bld cultures with gm + cocci (possible enterococcus);   Change to Vanc/Zosyn/Diflucan and consult ID  21 POD3; fevers on Vanc/Zosyn/Diflucan started yesterday; COVID test pending; ID consulted   8/24/21 POD4; AF on Vanc; bld culture with gm + cocci but no ID yet; On IV Vanc; hepatitis panel negative, HIV NR, and repeat bld cx pending; ID following; possible drug fever from beta lactam  8/25/21 POD5; awake in bed, no complaints. Repeat BC with NGTD. Urine cx pending. AF; remains on vanc         History reviewed. No pertinent past medical history. History reviewed. No pertinent surgical history. Current Facility-Administered Medications   Medication Dose Route Frequency    famotidine (PEPCID) tablet 20 mg  20 mg Oral BID    morphine injection 2-5 mg  2-5 mg IntraVENous Q1H PRN    vancomycin (VANCOCIN) 1,000 mg in 0.9% sodium chloride 250 mL (VIAL-MATE)  1,000 mg IntraVENous Q8H    ondansetron (ZOFRAN) injection 4 mg  4 mg IntraVENous Q4H PRN    acetaminophen (TYLENOL) tablet 1,000 mg  1,000 mg Oral Q6H PRN    oxyCODONE IR (ROXICODONE) tablet 5-10 mg  5-10 mg Oral Q4H PRN     Patient has no known allergies. Social History     Socioeconomic History    Marital status:      Spouse name: Not on file    Number of children: Not on file    Years of education: Not on file    Highest education level: Not on file     Social Determinants of Health     Financial Resource Strain:     Difficulty of Paying Living Expenses:    Food Insecurity:     Worried About Running Out of Food in the Last Year:     [address] in the Last Year:    Transportation Needs:     Lack of Transportation (Medical):      Lack of Transportation (Non-Medical):    Physical Activity:     Days of Exercise per Week:     Minutes of Exercise per Session:    Stress:     Feeling of Stress :    Social Connections:     Frequency of Communication with Friends and Family:     Frequency of Social Gatherings with Friends and Family:     Attends [employer] Services:     Active Member of Clubs or Organizations:     Attends Club or Organization Meetings:     Marital Status:      Social History     Tobacco Use   Smoking Status Not on file     No family history on file. ROS: The patient has no difficulty with chest pain or shortness of breath. No fever or chills. Comprehensive review of systems was otherwise unremarkable except as noted above. Physical Exam:   Visit Vitals  /71   Pulse 91   Temp 97.9 °F (36.6 °C)   Resp 18   Ht 5' 4\" (1.626 m)   Wt 170 lb (77.1 kg)   SpO2 98%   BMI 29.18 kg/m²     Vitals:    08/24/21 1959 08/25/21 0036 08/25/21 0319 08/25/21 0708   BP: 122/77 107/66 113/71 115/71   Pulse: 82 79 82 91   Resp: 17 17 18 18   Temp: 98.2 °F (36.8 °C) 98 °F (36.7 °C) 98.3 °F (36.8 °C) 97.9 °F (36.6 °C)   SpO2: 98% 98%  98%   Weight:       Height:         No intake/output data recorded. 08/23 1901 - 08/25 0700  In: 1877 [P.O.:1340; I.V.:537]  Out: 2940 [Urine:2925; Drains:15]    Constitutional: Alert, oriented, cooperative patient in no acute distress; appears stated age    Eyes:Sclera are clear. EOMs intact  ENMT: no external lesions gross hearing normal; no obvious neck masses, no ear or lip lesions, nares normal  CV: RRR. Normal perfusion  Resp: No JVD. Breathing is  non-labored; no audible wheezing. GI: non-distended; non-tender; Incisions OK; Jermaine drain serosang     Musculoskeletal: unremarkable with normal function. No embolic signs or cyanosis.    Neuro:  Oriented; moves all 4; no focal deficits  Psychiatric: normal affect and mood, no memory impairment    Recent vitals (if inpt):  Patient Vitals for the past 24 hrs:   BP Temp Pulse Resp SpO2   08/25/21 0708 115/71 97.9 °F (36.6 °C) 91 18 98 %   08/25/21 0319 113/71 98.3 °F (36.8 °C) 82 18    08/25/21 0036 107/66 98 °F (36.7 °C) 79 17 98 %   08/24/21 1959 122/77 98.2 °F (36.8 °C) 82 17 98 %   08/24/21 1510 120/76 98.3 °F (36.8 °C) 86 18 98 %   08/24/21 1133 126/82 98.7 °F (37.1 °C) 80 18 97 %       Amount and/or Complexity of Data Reviewed and Analyzed:  I reviewed and analyzed all of the unique labs and radiologic studies that are shown below as well as any that are in the HPI, and any that are in the expanded problem list below  *Each unique test, order, or document contributes to the combination of 2 or combination of 3 in Category 1 below. For this visit I also reviewed old records and prior notes. Recent Labs     08/25/21  0434 08/24/21  0550 08/24/21  0550   WBC 7.6   < > 6.3   HGB 14.0   < > 13.2*      < > 189      < > 137   K 3.5   < > 3.2*      < > 107   CO2 23   < > 23   BUN 13   < > 9   CREA 0.65*   < > 0.68*   GLU 91   < > 95   TBILI  --   --  0.9   ALT  --   --  121*   AP  --   --  145*    < > = values in this interval not displayed. Review of most recent CBC  Lab Results   Component Value Date/Time    WBC 7.6 08/25/2021 04:34 AM    HGB 14.0 08/25/2021 04:34 AM    HCT 41.5 08/25/2021 04:34 AM    PLATELET 520 17/23/7199 04:34 AM    MCV 86.3 08/25/2021 04:34 AM       Review of most recent BMP  Lab Results   Component Value Date/Time    Sodium 138 08/25/2021 04:34 AM    Potassium 3.5 08/25/2021 04:34 AM    Chloride 107 08/25/2021 04:34 AM    CO2 23 08/25/2021 04:34 AM    Anion gap 8 08/25/2021 04:34 AM    Glucose 91 08/25/2021 04:34 AM    BUN 13 08/25/2021 04:34 AM    Creatinine 0.65 (L) 08/25/2021 04:34 AM    GFR est AA >60 08/25/2021 04:34 AM    GFR est non-AA >60 08/25/2021 04:34 AM    Calcium 8.9 08/25/2021 04:34 AM       Review of most recent LFTs (and lipase if done)  Lab Results   Component Value Date/Time    ALT (SGPT) 121 (H) 08/24/2021 05:50 AM    AST (SGOT) 143 (H) 08/24/2021 05:50 AM    Alk.  phosphatase 145 (H) 08/24/2021 05:50 AM    Bilirubin, total 0.9 08/24/2021 05:50 AM     Lab Results   Component Value Date/Time    Lipase 93 08/20/2021 04:17 PM       No results found for: INR, APTT, CBIL, LCAD, NH4, TROPT, TROIQ, INREXT, INREXT    Review of most recent HgbA1c  No results found for: HBA1C, WMV0HQBT, ZUJ5DGYB, IWF2UCRG    Nutritional assessment screen for wound healing issues:  Lab Results   Component Value Date/Time    Protein, total 7.2 08/24/2021 05:50 AM    Albumin 2.8 (L) 08/24/2021 05:50 AM       @lastcovr@  XR Results (most recent):  Results from Hospital Encounter encounter on 08/20/21    XR CHEST PORT    Narrative  Single view chest x-ray    Clinical indication: Epigastric abdominal pain, sepsis    FINDINGS: Single view of the chest show the lungs to be expanded and clear. No  pleural effusion or pneumothorax. The cardiac silhouette and mediastinum are  unremarkable. The bones are normal.    Impression  Normal single view chest x-ray. CT Results (most recent):  Results from Hospital Encounter encounter on 08/20/21    CT ABD PELV W CONT    Narrative  CT OF THE ABDOMEN AND PELVIS WITH CONTRAST. CLINICAL INDICATION: Right lower quadrant abdominal pain for two days, fever,  elevated white blood cell count    PROCEDURE: Serial thin section axial images obtained from the lung bases through  the proximal femurs following the administration of 100 cc of Isovue 370  intravenous contrast.  Radiation dose reduction techniques were used for this  study. Our CT scanners use one or all of the following: Automated exposure  control, adjusted of the mA and/or kV according to patient size, iterative  reconstruction    COMPARISON: No prior    FINDINGS:    CT ABDOMEN: There is a dilated, inflamed appendix that is fluid-filled with  marked para appendiceal fat inflammation. The appendix measures 10 mm in  transverse dimension. No periappendiceal abscess or free air present to strongly  suspect perforation. The liver, spleen, pancreas and kidneys are normal. There is no hydronephrosis. The gallbladder is normal. The adrenal glands are normal. The bowel is normal in  course, caliber, and wall thickness. CT PELVIS: The rectum is normal. The bladder is normal. No interval hernia or  adenopathy.  No free fluid accumulating dependently in the pelvis. No aggressive osseous lesions identified. Impression  Acute appendicitis with marked periappendiceal inflammatory fat  stranding in the right lower abdominal quadrant without definite abscess or  extraluminal air to indicate perforation. These important findings were called to the emergency room physician Dr. Shena Pelletier  by Dr. Johanna Burger at 6:50 PM on 8/20/2021    US Results (most recent):  No results found for this or any previous visit. Admission date (for inpatients): 8/20/2021   * No surgery date entered *  Procedure(s):  LAPAROSCOPIC APPENDECTOMY        ASSESSMENT/PLAN:  Problem List  Date Reviewed: 8/20/2021        Codes Class Noted    Acute appendicitis ICD-10-CM: K35.80  ICD-9-CM: 540.9  8/20/2021    Overview Signed 8/20/2021  8:15 PM by Chan Galvez MD     8/20/21 s/p lap appendectomy; Dr Felisa Murrieta abdominal pain ICD-10-CM: R10.31  ICD-9-CM: 789.03  8/20/2021        Fever ICD-10-CM: R50.9  ICD-9-CM: 780.60  8/20/2021        Right lower quadrant guarding ICD-10-CM: R19.8  ICD-9-CM: 789.9  8/20/2021        Leukocytosis ICD-10-CM: D72.829  ICD-9-CM: 288.60  8/20/2021        Tachycardia ICD-10-CM: R00.0  ICD-9-CM: 785.0  8/20/2021        * (Principal) Sepsis ([address] ICD-10-CM: A41.9  ICD-9-CM: 038.9, 995.91  8/20/2021            Principal Problem:    Sepsis ([address] (8/20/2021)    Active Problems:    Acute appendicitis (8/20/2021)      Overview: 8/20/21 s/p lap appendectomy; Dr Dalton Bass      RLQ abdominal pain (8/20/2021)      Fever (8/20/2021)      Right lower quadrant guarding (8/20/2021)      Leukocytosis (8/20/2021)      Tachycardia (8/20/2021)           Number and Complexity of Problems addressed and   Risks of complications and/or morbidity of management        Sepsis with Fever, leukocytosis, RLQ pain, +guarding, CT evidence for acute appendicitis  He is s/p lap appendectomy on 8/20/21. He initially had sepsis.     No additional fevers  Rapid COVID testing negative; PCR pending  ID following and suspects drug fever  Hepatitis panel negative; HIV non-reactive  Repeat bld cx pending   Zosyn--->Vanc/Zosyn/diflucan-->Vanc alone  Bld cultures with gm + cocci so far--> No identification yet  Repeat CT scan abd/pelvis if needed if more fevers      Soft regular diet    Daily labs  Drain teaching done and post-op instructions done with pt using with  # [id number]    PO Pepcid  SCDs    Home when OK with ID      Pb Carr NP

## 2021-08-25 NOTE — DISCHARGE SUMMARY
[address] [facility]  [phone]   Discharge Summary     Catrachita Sifuentes  MRN: [id number]     : 1990     Age: 32 y.o. Admit date: 2021     Discharge date: 2021  Attending Physician: Olivier Tapia MD  Primary Discharge Diagnosis:   Principal Problem:    Sepsis ([address] (2021)    Active Problems:    Acute appendicitis (2021)      Overview: 21 s/p lap appendectomy; Dr Curran Smoke      RLQ abdominal pain (2021)      Fever (2021)      Right lower quadrant guarding (2021)      Leukocytosis (2021)      Tachycardia (2021)      Primary Operations or Procedures Performed :  Procedure(s):  LAPAROSCOPIC APPENDECTOMY     Brief History and Reason for Admission: Catrachita Sifuentes was admitted with the following history of present illness. Catrachita Sifuentes is a 32 y.o. male who is s/p lap appendectomy on 21.           Prior to surgery he came to the ER on 21 with a 3day h/o constant, severe, progressive RLQ abd pain. Nothing in particular made his pain better or worse. He had nausea but no vomiting. He had associated fever  He denied prior abdominal surgery. He had a markedly elevated WBC and a CT scan shown below which identified findings consistent with acute appendicitis. He had signs of sepsis.       21 CXR  Shows lungs to be expanded and clear. No pleural effusion or pneumothorax. The cardiac silhouette and mediastinum are unremarkable. The bones are normal.     IMPRESSION:  Normal single view chest x-ray.           21 CT abd/pelvis with IV contrast  There is a dilated, inflamed appendix that is fluid-filled with marked para appendiceal fat inflammation. The appendix measures 10 mm in transverse dimension.    No periappendiceal abscess or free air present to strongly suspect perforation.     The liver, spleen, pancreas and kidneys are normal. There is no hydronephrosis. The gallbladder is normal. The adrenal glands are normal. The bowel is normal in course, caliber, and wall thickness.     The rectum is normal. The bladder is normal. No interval hernia or adenopathy. No free fluid accumulating dependently in the pelvis. o aggressive osseous lesions identified.     IMPRESSION  Acute appendicitis with marked periappendiceal inflammatory fat stranding in RLQ   without definite abscess or extraluminal air to indicate perforation.             Hospital Course: The patient underwent Procedure(s):  LAPAROSCOPIC APPENDECTOMY on 8/20/2021. Additional hx:  8/21/21 POD1; Feels much better; still intermittent fever; WBC elevated but better; Cont IV Abx;  # [id number]  8/22/21 POD2; fevers on Zosyn; bld cultures with gm + cocci (possible enterococcus); Change to Vanc/Zosyn/Diflucan and consult ID  8/23/21 POD3; fevers on Vanc/Zosyn/Diflucan started yesterday; COVID test pending; ID consulted   8/24/21 POD4; AF on Vanc; bld culture with gm + cocci but no ID yet; On IV Vanc; hepatitis panel negative, HIV NR, and repeat bld cx pending; ID following; possible drug fever from beta lactam  8/25/21 POD5; awake in bed, no complaints. Repeat BC with NGTD. Urine cx pending. AF; remains on vanc    The patient progressed satisfactorily meeting milestones necessary for successful discharge including tolerating a diet, adequate mobility, adequate pain control, and active bowel function. Patient was deemed a good candidate for discharge at the time of morning rounding. They are to follow up as indicated in their provided discharge paperwork. The patient helped develop and voices understanding with the plan of care. They are amenable without reservations at this time to moving forward with discharge.      Condition at Discharge: Good    Discharge Medications:   Current Discharge Medication List      START taking these medications    Details   linezolid (ZYVOX) 600 mg tablet Take 1 Tablet by mouth every twelve (12) hours for 10 days. Qty: 20 Tablet, Refills: 0      HYDROcodone-acetaminophen (Norco) 5-325 mg per tablet Take 1-2 Tablets by mouth every eight (8) hours for 7 days. Max Daily Amount: 6 Tablets. Qty: 28 Tablet, Refills: 0    Associated Diagnoses: Acute appendicitis with localized peritonitis, without perforation, abscess, or gangrene; Right lower quadrant guarding               Disposition: Home    Discharge Instructions/Follow-up Care:      Dressings/Wound Care  Empty the drain as often as needed to keep it suctioning (compressed) at all times  Leave your incisional dressings alone for 5-7 days but then replace them daily with band-aids or gauze/tape  Try to keep incisions as dry as possible to lower risk of infection. Activity  No heavy lifting (>5lbs) for 6 weeks to reduce risk of developing a hernia in the incisions. No driving until you are off pain meds for 24hrs and have no pain with movements associated with driving. Pain prescription (Norco) electronically sent to your pharmacy  Antibiotic prescription also sent to your pharmacy for use at home-->It is important that yo take this entirely as directed  Follow-up with Dr Bello John nurse practitioner Aieme Burton NP or Owen Benavides NP) in 8 days on a Thursday. Then see Dr Dalton Bass as needed after that visit.   [address], [address]  (Call for an appt time unless one is already made for you by discharge nurse which is preferred -->[phone]-->option 1)    Diet  Soft diet until follow-up     Signed:  Adama Martin NP   8/25/2021  10:37 AM

## 2021-08-25 NOTE — PROGRESS NOTES
Pharmacokinetic Consult to Pharmacist    Haylie Joseph is a 32 y.o. male being treated with vancomycin. Height: 5' 4\" (162.6 cm)  Weight: 77.1 kg (170 lb)  Lab Results   Component Value Date/Time    BUN 13 08/25/2021 04:34 AM    Creatinine 0.65 (L) 08/25/2021 04:34 AM    WBC 7.6 08/25/2021 04:34 AM    Procalcitonin 13.73 08/20/2021 04:17 PM    Lactic acid 1.7 08/20/2021 04:23 PM      Estimated Creatinine Clearance: 143.6 mL/min (A) (by C-G formula based on SCr of 0.65 mg/dL (L)). Lab Results   Component Value Date/Time    Vancomycin,trough 14.6 08/25/2021 04:34 AM       Day 4 of vancomycin. Goal trough is 10-20. Vancomycin trough was therapeutic this AM at 14.6. Will continue 1000 mg every 8 hours for now. Will continue to follow patient and order levels when clinically indicated.       Thank you,  Darby Ortega, PharmD, [address]  Clinical Pharmacy Specialist  [phone]

## 2021-08-25 NOTE — PROGRESS NOTES
Problem: Pain  Goal: *Control of Pain  Outcome: Resolved/Met  Goal: *PALLIATIVE CARE:  Alleviation of Pain  Outcome: Resolved/Met

## 2021-08-25 NOTE — DISCHARGE INSTRUCTIONS
Dressings/Wound Care  Empty the drain as often as needed to keep it suctioning (compressed) at all times  Leave your incisional dressings alone for 5-7 days but then replace them daily with band-aids or gauze/tape  Try to keep incisions as dry as possible to lower risk of infection. Activity  No heavy lifting (>5lbs) for 6 weeks to reduce risk of developing a hernia in the incisions. No driving until you are off pain meds for 24hrs and have no pain with movements associated with driving. Pain prescription (Norco) electronically sent to your pharmacy  Antibiotic prescription also sent to your pharmacy for use at home-->It is important that yo take this entirely as directed  Follow-up with Dr Jaden York nurse practitioner Siri Harris NP or Donna Amaya NP) in 8 days on a Thursday. Then see Dr Madison Dewitt as needed after that visit. [address], [address]  (Call for an appt time unless one is already made for you by discharge nurse which is preferred -->[phone]-->option 1)    Diet  Soft diet until follow-up     Patient Education        Posible apendicitis: Instrucciones de cuidado  Possible Appendicitis: Care Instructions  Instrucciones de cuidado    Howell médico piensa que usted puede tener apendicitis. [place] significa que howell apéndice podría estar infectado. El apéndice es un pequeño saco que tiene forma de dedo. Está conectado al intestino grueso. A veces, es difícil determinar si rocio persona tiene apendicitis. Es especialmente difícil de determinar en [place] y [place]. Si howell médico piensa que es posible que tenga apendicitis, emilia vez quiera indicar más pruebas. O howell médico podría esperar a delonte si cambian virgin[place] síntomas. Howell médico opina que está carmel que usted se vaya a casa ahora mismo. Balaji tendrá que estar atento a los síntomas de apendicitis cuando esté en casa.  Si virginia síntomas continúan o empeoran, es importante que llame al médico o consiga atención médica de inmediato. La apendicitis puede volverse grave rápidamente. El principal tratamiento es rocio operación para extraer el apéndice. La atención de seguimiento es rocio parte clave de zuniga tratamiento y seguridad. Asegúrese de hacer y acudir a todas las citas, y llame a zuniga médico si está teniendo problemas. También es rocio buena idea saber los resultados de virgin[place] exámenes y mantener rocio lista de los medicamentos que mary. ¿Cómo puede cuidarse en el hogar? · No coma ni jeffry, a menos que zuniga médico le diga que puede [place]. Si necesita operarse, es mejor hacerlo con el estómago vacío. Si tiene sed, puede enjuagarse la boca con agua. O puede chupar un caramelo efrem. · No tome laxantes. Si tiene apendicitis, pueden hacer que el apéndice se reviente. · [address] zuniga médico acerca de cain medicamentos. El médico puede indicarle que no tome antibióticos ni analgésicos (medicamentos para el dolor). Estos medicamentos pueden hacer que sea más difícil determinar si tiene apendicitis. · Esté alerta a los síntomas de apendicitis. Judi la siguiente sección ¿Cuándo debe pedir ayuda? . Es muy importante que siga las instrucciones del médico acerca de recibir tratamiento si tiene estos síntomas. ¿Cuándo debe pedir ayuda? Llame al 911 en cualquier momento que considere que necesita atención de [place]. Por ejemplo, llame si:    · Se desmayó (perdió el conocimiento).     · Tiene dolor abdominal nuevo e intenso y se siente débil. Llame a zuniga médico ahora mismo o busque atención médica inmediata si:    · Siente dolor abdominal debajo del ombligo, del lado derecho del abdomen.     · Zuniga dolor abdominal aumenta cuando se mueve, camina o tose.     · Zuniga dolor abdominal no mejora después de unos safia.     · Tiene fiebre de más de 100°F (37.8°C).      · Siente el estómago revuelto o no puede retener líquidos en el estómago.     · Tiene problemas para expulsar gases o heces.     · Zuniga abdomen está abotagado o hinchado. Preste especial atención a los cambios en zuniga bryan y asegúrese de comunicarse con zuniga médico si:    · No mejora olvin se esperaba. ¿Dónde puede encontrar más información en inglés? Jaja [place] a http://www.gray.com/  Tony M606 en la búsqueda para aprender más acerca de \"Posible apendicitis: Instrucciones de cuidado. \"  Revisado: 15 srinath, 2020               Versión del contenido: 12.8  © 7588-9663 Healthwise, Incorporated. Las instrucciones de cuidado fueron adaptadas bajo licencia por [employer] (which disclaims liability or warranty for this information). Si usted tiene [place] afección médica o sobre estas instrucciones, siempre pregunte a zuniga profesional de bryan. Healthwise, Incorporated niega toda garantía o responsabilidad por zuniga uso de esta información. Patient Education        Elena Simeon de la apendicectomía  Learning About Appendectomy  ¿Qué es rocio apendicectomía? Vassie Parma es rocio cirugía para extirpar el apéndice. Karuna órgano es un pequeño saco con forma de dedo. Está conectado al intestino grueso. La apendicitis ocurre cuando el apéndice se infecta y se inflama. Vassie Brewton es el principal tratamiento para la apendicitis. Si se demora la [facility] [place], el apéndice inflamado puede reventarse. Un apéndice reventado puede causar graves problemas de [place]. Si se le ha reventado el apéndice, es posible que necesite rocio cirugía de [place] para extirpar el apéndice reventado. ¿Cómo se hace esta cirugía? Antes de la cirugía, le administrarán un medicamento para dormirlo. La apendicectomía suele realizarse olvin cirugía laparoscópica. Remer significa que se hace solo con doyle pequeños. Estos doyle se llaman incisiones. El [employer] coloca un tubo con shantell, o laparoscopio, y otros instrumentos quirúrgicos a través de los doyle en el abdomen. El médico puede observar virgin[place] órganos con el laparoscopio.  El [place] extrae el apéndice. Los doyle sanan rápidamente y las cicatrices suelen volverse menos visibles con el tiempo. En algunos casos, la cirugía se hace por medio de un solo ebony juju en el abdomen. [place] se llama cirugía abierta. ¿Qué puede esperar después de la cirugía? La mayoría de las personas permanecen en el hospital por entre 1 y 1 días después de la [place]. Algunas incluso regresan a zuniga hogar el mismo día. Después de volver a casa, es normal sentirse débil y cansado por varios días. Es posible que tenga el abdomen hinchado y adolorido. Si le hicieron rocio cirugía laparoscópica, podría sentir dolor en el hombro michelle unas 24 horas. También podría tener [employer], diarrea, estreñimiento, gases o dolor de [place]. Por lo general, estos síntomas desaparecen en pocos safia. Zuniga período de recuperación depende del tipo de cirugía que le hicieron. Si le hicieron cirugía laparoscópica, es probable que pueda regresar al Alex Manifold o a zuniga rutina normal entre 1 y 1 semanas después de la [place]. Podría tardar de 2 a 4 semanas si le hicieron [place]. Si se le reventó el apéndice, es posible que le hayan colocado un drenaje en la incisión. Zuniga organismo funcionará normalmente sin el apéndice. No tendrá que hacer ningún cambio en zuniga dieta ni en zuniga victoriano diaria. Después de la [place], asegúrese de seguir los consejos de zuniga médico respecto a problemas a los que estar atento. Estos pueden incluir fiebre, dolor abdominal más intenso o problemas con la incisión. La atención de seguimiento es rocio parte clave de zuniga tratamiento y seguridad. Asegúrese de hacer y acudir a todas las citas, y llame a zuniga médico si está teniendo problemas. También es rocio buena idea saber los resultados de [place] exámenes y mantener rocio lista de los medicamentos que mary. ¿Dónde puede encontrar más información en inglés?   Vaya a http://www.gray.com/  Tony H968 en la búsqueda para aprender más acerca de \"Aprenda acerca de la apendicectomía. \"  Revisado: 15 srinath, 2020               Versión del contenido: 12.8  © 0929-4565 Healthwise, Incorporated. Las instrucciones de cuidado fueron adaptadas bajo licencia por [employer] (which disclaims liability or warranty for this information). Si usted tiene [place] afección médica o sobre estas instrucciones, siempre pregunte a zuniga profesional de bryan. Healthwise, Incorporated niega toda garantía o responsabilidad por zuniga uso de esta información. DISCHARGE SUMMARY from Nurse    PATIENT INSTRUCTIONS:    After general anesthesia or intravenous sedation, for 24 hours or while taking prescription Narcotics:  · Limit your activities  · Do not drive and operate hazardous machinery  · Do not make important personal or business decisions  · Do  not drink alcoholic beverages  · If you have not urinated within 8 hours after discharge, please contact your surgeon on call. Report the following to your surgeon:  · Excessive pain, swelling, redness or odor of or around the surgical area  · Temperature over 100.5  · Nausea and vomiting lasting longer than 4 hours or if unable to take medications  · Any signs of decreased circulation or nerve impairment to extremity: change in color, persistent  numbness, tingling, coldness or increase pain  · Any questions    What to do at Home:  Recommended activity: No heavy lifting, pushing, pulling for 6  Weeks. If you experience any of the following symptoms fever greater than 100.4 unable to bring down with tylenol,nausea, vomiting, increased pain, foul smelling drainage from incision site or redness, please follow up with Shanthi Hernandez MD.    *  Please give a list of your current medications to your Primary Care Provider. *  Please update this list whenever your medications are discontinued, doses are      changed, or new medications (including over-the-counter products) are added.     *  Please carry medication information at all times in case of emergency situations. These are general instructions for a healthy lifestyle:    No smoking/ No tobacco products/ Avoid exposure to second hand smoke  Surgeon General's Warning:  Quitting smoking now greatly reduces serious risk to your health. Obesity, smoking, and sedentary lifestyle greatly increases your risk for illness    A healthy diet, regular physical exercise & weight monitoring are important for maintaining a healthy lifestyle    You may be retaining fluid if you have a history of heart failure or if you experience any of the following symptoms:  Weight gain of 3 pounds or more overnight or 5 pounds in a week, increased swelling in our hands or feet or shortness of breath while lying flat in bed. Please call your doctor as soon as you notice any of these symptoms; do not wait until your next office visit. The discharge information has been reviewed with the patient. The patient verbalized understanding. Discharge medications reviewed with the patient and appropriate educational materials and side effects teaching were provided.   ___________________________________________________________________________________________________________________________________

## 2021-08-25 NOTE — PROGRESS NOTES
END OF SHIFT NOTE:    INTAKE/OUTPUT  08/24 0701 - 08/25 0700  In: 168 [P.O.:360; I.V.:537]  Out: 1055 [Urine:1050; Drains:5]  Voiding: YES  Catheter: NO  Drain:   Gailen Cordon #1 08/20/21 Anterior; Lower Abdomen (Active)   Site Assessment Clean, dry, & intact 08/24/21 0845   Dressing Status Clean, dry, & intact 08/24/21 0845   Drainage Description Serosanguinous 08/24/21 0845   Jermaine Drain Airleak No 08/24/21 0845   Status Patent;Draining 08/24/21 0845   Y Connector Used No 08/24/21 0845   Output (ml) 0 ml 08/24/21 1752               Flatus: Patient does have flatus present. Stool:  0 occurrences. Characteristics:  Stool Assessment  Stool Appearance: Loose    Emesis: 0 occurrences. Characteristics:        VITAL SIGNS  Patient Vitals for the past 12 hrs:   Temp Pulse Resp BP SpO2   08/25/21 0319 98.3 °F (36.8 °C) 82 18 113/71    08/25/21 0036 98 °F (36.7 °C) 79 17 107/66 98 %   08/24/21 1959 98.2 °F (36.8 °C) 82 17 122/77 98 %       Pain Assessment  Pain Intensity 1: 7 (08/25/21 0445)  Pain Location 1: Abdomen  Pain Intervention(s) 1: Medication (see MAR)  Patient Stated Pain Goal: 0    Ambulating  Yes    Shift report given to oncoming nurse at the bedside.     Laurence Torres RN

## 2021-08-26 LAB
BACTERIA SPEC CULT: NORMAL
SERVICE CMNT-IMP: NORMAL

## 2021-09-03 PROBLEM — Z90.49 S/P APPENDECTOMY: Status: ACTIVE | Noted: 2021-09-03

## 2021-09-03 LAB
BACTERIA SPEC CULT: ABNORMAL
GRAM STN SPEC: ABNORMAL
SERVICE CMNT-IMP: ABNORMAL

## 2021-09-07 LAB
Lab: NORMAL
REFERENCE LAB,REFLB: NORMAL
TEST DESCRIPTION:,ATST: NORMAL

## (undated) DEVICE — SOLUTION IV 1000ML 0.9% SOD CHL

## (undated) DEVICE — APPLICATOR FBR TIP L6IN COT TIP WOOD SHFT SWAB 2000 PER CA

## (undated) DEVICE — TRAY PREP DRY W/ PREM GLV 2 APPL 6 SPNG 2 UNDPD 1 OVERWRAP

## (undated) DEVICE — REM POLYHESIVE ADULT PATIENT RETURN ELECTRODE: Brand: VALLEYLAB

## (undated) DEVICE — 2000CC GUARDIAN II: Brand: GUARDIAN

## (undated) DEVICE — TROCAR: Brand: KII FIOS FIRST ENTRY

## (undated) DEVICE — DRAIN SURG 19FR 100% SIL RADPQ RND CHN FULL FLUT

## (undated) DEVICE — TROCARS: Brand: KII® BLUNT TIP ACCESS SYSTEM

## (undated) DEVICE — TUBING INSUFFLATION SMK EVAC HI FLO SET PNEUMOCLEAR

## (undated) DEVICE — KIT,ANTI FOG,W/SPONGE & FLUID,SOFT PACK: Brand: MEDLINE

## (undated) DEVICE — TRAY,URINE METER,100% SILICONE,16FR10ML: Brand: MEDLINE

## (undated) DEVICE — 3M™ TEGADERM™ TRANSPARENT FILM DRESSING FRAME STYLE, 1624W, 2-3/8 IN X 2-3/4 IN (6 CM X 7 CM), 100/CT 4CT/CASE: Brand: 3M™ TEGADERM™

## (undated) DEVICE — RELOAD STPL L45MM H1.5-3.6MM REG TISS BLU GRIPPING SURF B

## (undated) DEVICE — Device

## (undated) DEVICE — SEALER ENDOSCP L37CM NANO COAT BLNT TIP LAP DIV

## (undated) DEVICE — BANDAGE COMPR XL KNEE ELBW TAN TUBIGRIP ARTHRO-PAD LTX

## (undated) DEVICE — GENERAL LAPAROSCOPY: Brand: MEDLINE INDUSTRIES, INC.

## (undated) DEVICE — STAPLER INT L340MM 45MM STD 12 FIRING B FRM PWR + GRIPPING

## (undated) DEVICE — GAUZE,SPONGE,4"X4",16PLY,STRL,LF,10/TRAY: Brand: MEDLINE

## (undated) DEVICE — PAD,NON-ADHERENT,3X8,STERILE,LF,1/PK: Brand: MEDLINE

## (undated) DEVICE — BAG SPEC RETRV 275ML 10ML DISPOSABLE RELIACATCH

## (undated) DEVICE — LOGICUT SCISSOR LENGTH 320MM: Brand: LOGI - LAPAROSCOPIC INSTRUMENT SYSTEM

## (undated) DEVICE — SOLUTION IRRIG 3000ML 0.9% SOD CHL FLX CONT 0797208] ICU MEDICAL INC]

## (undated) DEVICE — SUTURE SZ 0 27IN 5/8 CIR UR-6  TAPER PT VIOLET ABSRB VICRYL J603H

## (undated) DEVICE — STANDARD HYPODERMIC NEEDLE,POLYPROPYLENE HUB: Brand: MONOJECT

## (undated) DEVICE — GARMENT,MEDLINE,DVT,INT,CALF,MED, GEN2: Brand: MEDLINE

## (undated) DEVICE — RESERVOIR,SUCTION,100CC,SILICONE: Brand: MEDLINE